# Patient Record
Sex: FEMALE | Race: WHITE | NOT HISPANIC OR LATINO | Employment: OTHER | URBAN - METROPOLITAN AREA
[De-identification: names, ages, dates, MRNs, and addresses within clinical notes are randomized per-mention and may not be internally consistent; named-entity substitution may affect disease eponyms.]

---

## 2017-08-23 ENCOUNTER — OFFICE VISIT (OUTPATIENT)
Dept: CARDIOLOGY | Facility: CLINIC | Age: 72
End: 2017-08-23
Payer: MEDICARE

## 2017-08-23 VITALS
BODY MASS INDEX: 30.34 KG/M2 | HEIGHT: 64 IN | SYSTOLIC BLOOD PRESSURE: 136 MMHG | WEIGHT: 177.69 LBS | HEART RATE: 56 BPM | DIASTOLIC BLOOD PRESSURE: 76 MMHG | OXYGEN SATURATION: 97 %

## 2017-08-23 DIAGNOSIS — I10 ESSENTIAL (PRIMARY) HYPERTENSION: Primary | ICD-10-CM

## 2017-08-23 DIAGNOSIS — I25.10 CORONARY ARTERY DISEASE INVOLVING NATIVE CORONARY ARTERY OF NATIVE HEART WITHOUT ANGINA PECTORIS: ICD-10-CM

## 2017-08-23 DIAGNOSIS — I34.0 NON-RHEUMATIC MITRAL REGURGITATION: ICD-10-CM

## 2017-08-23 DIAGNOSIS — E78.00 HYPERCHOLESTEROLEMIA: ICD-10-CM

## 2017-08-23 PROCEDURE — 1159F MED LIST DOCD IN RCRD: CPT | Mod: S$GLB,,, | Performed by: INTERNAL MEDICINE

## 2017-08-23 PROCEDURE — 3078F DIAST BP <80 MM HG: CPT | Mod: S$GLB,,, | Performed by: INTERNAL MEDICINE

## 2017-08-23 PROCEDURE — 99204 OFFICE O/P NEW MOD 45 MIN: CPT | Mod: S$GLB,,, | Performed by: INTERNAL MEDICINE

## 2017-08-23 PROCEDURE — 1126F AMNT PAIN NOTED NONE PRSNT: CPT | Mod: S$GLB,,, | Performed by: INTERNAL MEDICINE

## 2017-08-23 PROCEDURE — 3075F SYST BP GE 130 - 139MM HG: CPT | Mod: S$GLB,,, | Performed by: INTERNAL MEDICINE

## 2017-08-23 RX ORDER — CHOLECALCIFEROL (VITAMIN D3) 25 MCG
2000 TABLET ORAL DAILY
COMMUNITY

## 2017-08-23 RX ORDER — HYDROCHLOROTHIAZIDE 12.5 MG/1
12.5 CAPSULE ORAL EVERY OTHER DAY
COMMUNITY
End: 2018-07-25 | Stop reason: SDDI

## 2017-08-23 RX ORDER — VALSARTAN 40 MG/1
40 TABLET ORAL DAILY
Qty: 90 TABLET | Refills: 1 | Status: SHIPPED | OUTPATIENT
Start: 2017-08-23 | End: 2017-11-27 | Stop reason: SDUPTHER

## 2017-08-23 NOTE — PROGRESS NOTES
Subjective:    Patient ID:  Cat Meza is a 71 y.o. female who presents for Valvular Heart Disease; Hypertension; Hyperlipidemia; and Coronary Artery Disease      HPI  PT WAS FOLLOWED AT [facility], NEW TO THIS CLINIC, [place]  [place] [facility] [place] [facility] [place] STOPPED DIOVAN FEW DAY AGO B/O LOW BP, SEE ROS  Past Medical History:   Diagnosis Date    Acute coronary syndrome     LIGHT HEART ATTACK YEARS AGO ??    Cancer     breast    Cancer     lung    Coronary artery disease     Diabetes mellitus     Edema     Heart murmur     Hyperlipidemia     Hypertension     Thyroid disease     hypothyroid    Valvular regurgitation      Past Surgical History:   Procedure Laterality Date    BREAST SURGERY      CARDIAC CATHETERIZATION      CHOLECYSTECTOMY      HYSTERECTOMY      MASTECTOMY       Family History   Problem Relation Age of Onset    Heart disease Mother     Cancer Father      Social History     Social History    Marital status: Single     Spouse name: N/A    Number of children: N/A    Years of education: N/A     Social History Main Topics    Smoking status: Former Smoker     Quit date: 8/13/2002    Smokeless tobacco: Never Used    Alcohol use No    Drug use: No    Sexual activity: Not Asked     Other Topics Concern    None     Social History Narrative    None       Review of patient's allergies indicates:   Allergen Reactions    Adhesive     Niacin preparations     Penicillins     Shellfish containing products     Ciprofloxacin Rash and Other (See Comments)     Pain at infusion site       Current Outpatient Prescriptions:     ascorbic acid (VITAMIN C) 1000 MG tablet, Take 1,000 mg by mouth once daily., Disp: , Rfl:     aspirin 81 MG Chew, Take 81 mg by mouth once daily., Disp: , Rfl:     cyanocobalamin (VITAMIN B-12) 1000 MCG tablet, Take 100 mcg by mouth once daily., Disp: , Rfl:     esomeprazole (NEXIUM) 40 MG capsule, Take 40 mg by mouth before breakfast., Disp: , Rfl:     ezetimibe (ZETIA) 10 mg tablet, Take  10 mg by mouth once daily., Disp: , Rfl:     hydrochlorothiazide (MICROZIDE) 12.5 mg capsule, Take 12.5 mg by mouth every other day., Disp: , Rfl:     lactobacillus acidophilus & bulgar (LACTINEX) 100 million cell packet, Take 1 tablet by mouth once daily. , Disp: , Rfl:     levothyroxine (SYNTHROID) 50 MCG tablet, Take 50 mcg by mouth once daily., Disp: , Rfl:     magnesium oxide (MAG-OX) 250 mg Tab, Take 250 mg by mouth once daily., Disp: , Rfl:     metformin (GLUCOPHAGE) 500 MG tablet, Take 500 mg by mouth daily with breakfast., Disp: , Rfl:     metoprolol succinate (TOPROL-XL) 25 MG 24 hr tablet, Take 25 mg by mouth once daily., Disp: , Rfl:     vitamin D 1000 units Tab, Take 2,000 Units by mouth once daily., Disp: , Rfl:     valsartan (DIOVAN) 40 MG tablet, Take 1 tablet (40 mg total) by mouth once daily., Disp: 90 tablet, Rfl: 1    Review of Systems   Constitution: Negative for chills, diaphoresis, weakness, malaise/fatigue and night sweats.   HENT: Negative for hearing loss and nosebleeds. Headaches: SINUS.    Eyes: Negative for blurred vision, discharge, double vision and visual disturbance.   Cardiovascular: Negative for chest pain, claudication, cyanosis, irregular heartbeat, leg swelling, near-syncope, orthopnea, palpitations, paroxysmal nocturnal dyspnea and syncope. Dyspnea on exertion: MILD AT TIMES,SAME,HUMIDITY.   Respiratory: Negative for cough, hemoptysis, sleep disturbances due to breathing, sputum production and wheezing.    Endocrine: Negative for cold intolerance, heat intolerance and polyuria.   Hematologic/Lymphatic: Negative for adenopathy. Does not bruise/bleed easily.   Skin: Negative for color change, itching and nail changes.   Musculoskeletal: Positive for arthritis. Negative for back pain and falls.   Gastrointestinal: Negative for abdominal pain, change in bowel habit, constipation, dysphagia, heartburn, hematemesis, jaundice, melena and vomiting.   Genitourinary: Negative for  "dysuria, flank pain and frequency.   Neurological: Negative for brief paralysis, difficulty with concentration, disturbances in coordination, dizziness, focal weakness, light-headedness, loss of balance, numbness, paresthesias, seizures, sensory change and tremors.   Psychiatric/Behavioral: Negative for altered mental status, depression, memory loss and substance abuse. The patient is not nervous/anxious.    Allergic/Immunologic: Negative for persistent infections.        Objective:      Vitals:    08/23/17 1108   BP: 136/76   Pulse: (!) 56   SpO2: 97%   Weight: 80.6 kg (177 lb 11.1 oz)   Height: 5' 4" (1.626 m)   PainSc: 0-No pain     Body mass index is 30.5 kg/m².    Physical Exam   Constitutional: She is oriented to person, place, and time. She appears well-developed and well-nourished. She is active.   HENT:   Head: Normocephalic and atraumatic.   Mouth/Throat: Oropharynx is clear and moist and mucous membranes are normal.   Eyes: Conjunctivae and EOM are normal. Pupils are equal, round, and reactive to light.   Neck: Trachea normal and normal range of motion. Neck supple. Normal carotid pulses, no hepatojugular reflux and no JVD present. Carotid bruit is not present. No tracheal deviation, no edema and no erythema present. No thyromegaly present.   Cardiovascular: Normal rate and regular rhythm.   No extrasystoles are present. PMI is not displaced.  Exam reveals no gallop and no friction rub.    Murmur heard.   Systolic murmur is present with a grade of 1/6  at the lower left sternal border, apex  Pulses:       Carotid pulses are 2+ on the right side, and 2+ on the left side.       Radial pulses are 2+ on the right side, and 2+ on the left side.        Femoral pulses are 2+ on the right side, and 2+ on the left side.       Dorsalis pedis pulses are 2+ on the right side, and 2+ on the left side.        Posterior tibial pulses are 2+ on the right side, and 2+ on the left side.   Mild varicose veins "   Pulmonary/Chest: Effort normal and breath sounds normal. No tachypnea and no bradypnea. She has no wheezes. She has no rales. She exhibits no tenderness.   Abdominal: Soft. Bowel sounds are normal. She exhibits no distension and no mass. There is no hepatosplenomegaly. There is no tenderness. There is no guarding and no CVA tenderness.   Musculoskeletal: Normal range of motion. She exhibits no edema or tenderness.   Lymphadenopathy:     She has no cervical adenopathy.   Neurological: She is alert and oriented to person, place, and time. She has normal strength. She displays no tremor. No cranial nerve deficit. She exhibits normal muscle tone. Coordination normal.   Skin: Skin is warm and dry. No rash noted. No cyanosis or erythema. No pallor.   Psychiatric: She has a normal mood and affect. Her speech is normal and behavior is normal. Judgment and thought content normal.               ..    Chemistry        Component Value Date/Time     06/12/2016 0557    K 4.2 06/12/2016 0557     06/12/2016 0557    CO2 27 06/12/2016 0557    BUN 14 06/12/2016 0557    CREATININE 0.77 06/12/2016 0557    GLU 85 06/12/2016 0557        Component Value Date/Time    CALCIUM 8.4 (L) 06/12/2016 0557    ESTGFRAFRICA >60 06/12/2016 0557    EGFRNONAA >60 06/12/2016 0557            ..No results found for: CHOL  No results found for: HDL  No results found for: LDLCALC  No results found for: TRIG  No results found for: CHOLHDL  ..  Lab Results   Component Value Date    WBC 10.84 06/13/2016    HGB 11.4 (L) 06/13/2016    HCT 36.1 (L) 06/13/2016    MCV 93 06/13/2016     06/13/2016       Test(s) Reviewed  I have reviewed the following in detail:  [] Stress test   [] Angiography   [] Echocardiogram   [x] Labs   [x] Other:       Assessment:         ICD-10-CM ICD-9-CM   1. Essential (primary) hypertension I10 401.9   2. Coronary artery disease involving native coronary artery of native heart without angina pectoris I25.10 414.01   3.  Non-rheumatic mitral regurgitation I34.0 424.0   4. Hypercholesterolemia E78.00 272.0     Problem List Items Addressed This Visit        Cardiac/Vascular    Essential (primary) hypertension - Primary    Relevant Orders    Comprehensive metabolic panel    Coronary artery disease involving native coronary artery of native heart without angina pectoris    Relevant Orders    Comprehensive metabolic panel    Non-rheumatic mitral regurgitation    Hypercholesterolemia    Relevant Orders    Comprehensive metabolic panel    Lipid panel      Other Visit Diagnoses    None.          Plan:     ALL CV CLINICALLY STABLE, NO ANGINA, NO HF, NO TIA, NO CLINICAL ARRHYTHMIA, EDUCATION, DIET, EXERCISE, CALL [facility] , GET LABS/DONE, DECREASE DIOVAN TO 4O MG QHS    , LABS RECEIVED LATER,, CMP OK, RTC IN 6 MO WITH LABS  Essential (primary) hypertension  -     Cancel: Comprehensive metabolic panel; Future; Expected date: 08/23/2017  -     Comprehensive metabolic panel; Future; Expected date: 08/23/2017    Coronary artery disease involving native coronary artery of native heart without angina pectoris  -     Cancel: Comprehensive metabolic panel; Future; Expected date: 08/23/2017  -     Comprehensive metabolic panel; Future; Expected date: 08/23/2017    Non-rheumatic mitral regurgitation    Hypercholesterolemia  -     Cancel: Comprehensive metabolic panel; Future; Expected date: 08/23/2017  -     Cancel: Lipid panel; Future; Expected date: 08/23/2017  -     Comprehensive metabolic panel; Future; Expected date: 08/23/2017  -     Lipid panel; Future; Expected date: 08/23/2017    Other orders  -     valsartan (DIOVAN) 40 MG tablet; Take 1 tablet (40 mg total) by mouth once daily.  Dispense: 90 tablet; Refill: 1    RTC Low level/low impact aerobic exercise 5x's/wk. Heart healthy diet and risk factor modification.    See labs and med orders.    Aerobic exercise 5x's/wk. Heart healthy diet and risk factor modification.    See labs and med  orders.

## 2017-08-23 NOTE — PATIENT INSTRUCTIONS
Understanding Coronary Artery Disease (CAD)    To understand coronary artery disease (CAD), you need to know how your heart works. Your heart is a muscle that pumps blood throughout your body. To work right, your heart needs a steady supply of oxygen. It gets this oxygen from blood supplied by the coronary arteries.        Healthy Artery      Damaged Artery      Narrowed Artery      Blocked Artery   Healthy artery. When a coronary artery is healthy and has no blockages, blood flows through easily. Healthy arteries can easily supply the oxygen-rich blood your heart needs.  Damaged artery. Coronary artery disease begins when damage to the artery lining leads to the buildup of fat-like substances and cholesterol along the artery wall. This is called plaque. This damage could be caused by things like high blood pressure or smoking. This plaque buildup begins to narrow the arteries carrying blood to the heart. This is called atherosclerosis,  Narrowed artery. As more plaque builds up, your artery has trouble supplying blood to your heart muscle when it needs it most, such as during exercise. You may not feel any symptoms when this happens. Or you may feel angina--pressure, tightness, achiness, or pain in your chest, jaw, neck, back, or arm.  Blocked artery. A piece of plaque may break off and completely block the artery. Or a blood clot may plug the narrowed artery. When this happens, blood flow is blocked from reaching the heart. Without oxygen-rich blood, part of the heart muscle becomes damaged and stops working. You may feel crushing pressure or pain in or around your chest. This is a heart attack (acute myocardial infarction, or AMI) and is a medical emergency.  Date Last Reviewed: 3/28/2016  © 8810-7633 [employer]. [address] [place] [address], [place] [address]. All rights reserved. This information is not intended as a substitute for professional medical care. Always follow your healthcare  professional's instructions.        Heart Disease Education    The heart beats 60 to 100 times per minute, 24 hours a day. This equals almost 1000,000 times a day. It pumps blood with oxygen and nutrients to the tissues and organs of the body. But the heart is a muscle and needs its own supply of blood. Blood flow to the heart is supplied by the coronary arteries. Coronary artery disease (atherosclerosis) is a result of cholesterol, saturated fat, and calcium deposits (plaques) that build up inside the walls. This causes inflammation within the coronary arteries. These plaques narrow the artery and reduce blood flow to the heart muscle. The reduction in blood flow to the heart muscle decreases oxygen supply to the heart. If the narrowing is significant enough, the oxygen supply to one or more regions of the heart can be temporarily or permanently shut down. This can cause chest pain, and possibly death of heart tissue (heart attack).  Types of chest pain  Angina is the name for pain in the heart muscle. Angina is a warning sign of serious heart disease. When untreated it can lead to a heart attack, also known as acute myocardial infarction, or AMI. Angina occurs when there is not enough blood and oxygen flowing to the heart for the amount of work it is doing. This most often happens during physical exertion, when the heart is working hardest. It is usually relieved by rest or nitroglycerin. Angina may also occur after a large meal when extra blood is sent to the digestive organs and less goes to the heart. In the case of advanced or unstable heart disease, angina can occur at rest or awaken you from sleep. Angina usually lasts from a few minutes up to 20 minutes or more. When treated early, the effects of angina can be reversed without permanent damage to the heart. Angina is a serious condition and needs to be evaluated by a medical professional immediately.  There are two types of angina -- stable and  unstable:  · Stable angina usually occurs with a predictable level of activity. Being stable, its character, severity, and occurrence do not change much over time. It usually starts with activity, and resolves with rest or taking your medicine as instructed by your doctor. The symptoms usually do not last long.  · Unstable angina changes or gets worse over time. It is different from whatever you are used to. It may feel different or worse, begin without cause, occur with exercise or exertion, wake you up from sleep, and last longer. It may not respond in the same way as it does when you take your usual medicines for an attack. This type of angina can be a warning sign of an impending heart attack.     A heart attack is usually the result of a blood clot that suddenly forms in a coronary artery that has been narrowed with plaque. When this occurs, blood flow may be cut off to a part of the heart muscle, causing the cells to die. This weakens the pumping action of the heart, which affects the delivery of blood to all the other organs in the body including the brain. This damage is not reversible. However, early treatment can limit the amount of damage.  The pain you feel with angina and a heart attack may have a similar quality. However, it is usually different in intensity and duration. Here are some typical descriptions of a heart attack:  · It is most often experienced as a squeezing, crushing, pressure-like sensation in the center of the chest.  · It is sometimes described as something heavy sitting on my chest.  · It may feel more like a bad case of indigestion.  · The pain may spread from the chest to the arm, shoulder, throat or jaw.  · Sometimes the pain is not felt in the chest at all, but only in the arm, shoulder, throat or jaw.  · There may also be nausea, vomiting, dizziness or light-headedness, sweating and trouble breathing.  · Palpitations, or your heart beating rapidly  · A new, irregular heart  "beat  · Unexplained weakness  You may not be able to tell the difference between "bad" angina and a heart attack at home. Seek help if your symptoms are different than usual. Do not be in denial or just try to "tough it out."  Call 911  This is the fastest and safest way to get to the emergency department. The paramedics can also start treatment on the way to the hospital, saving valuable time for your heart.  · If the angina gets worse, if it continues, or if it stops and returns, call 911 immediately. Do not delay. You may be having a heart attack.  · After you call 911, take a second tablet or spray unless instructed otherwise. When repeating doses, sit down if possible, because it can make you feel lightheaded or dizzy. Wait another 5 minutes. If the angina still does not go away, take a third tablet or spray. Do not take more than 3 tablets or sprays within 15 minutes. Stay on the phone with 911 for further instruction.  · Your healthcare provider may give you slightly different instructions than those above. If so, follow them carefully.  Do not wait until symptoms become severe to call 911.  Other reasons to call 911 include:  · Trouble breathing  · Feeling lightheaded, faint, or dizzy  · Rapid heart beat  · Slower than usual heart rate compared to your normal  · Angina with weakness, dizziness, fainting, heavy sweating, nausea, or vomiting  · Extreme drowsiness, confusion  · Weakness of an arm or leg or one side of the face  · Difficulty with speech or vision  When to seek medical care  Remember, the signs and symptoms of a heart attack are not always like they are on TV. Sometimes they are not so obvious. You may only feel weak, or just not right. If it is not clear or if you have any doubt, call for advice.  · Seek help if there is a change in the type of pain, if it feels different, or if your symptoms are mild.  · Do not drive yourself. Have someone else drive you. If no one can drive, call 911.  · Do " "not delay. Fast diagnosis and treatment can prevent or limit the amount of heart damage during a heart attack.  · Do not go to your doctor's office or a clinic as they may not be able to provide all the testing and treatment required for this condition.  · If your doctor has given you medicine to take when symptoms occur, take them but don't delay getting help trying to locate medicines.  What happens in the emergency department  The emergency department is connected to your local emergency medical system (EMS) through 911. That's why during a cardiac emergency, calling 911 is the fastest way to get help. The goal of the emergency department is to rapidly screen, evaluate, and treat people.  Once you are there, an electrocardiogram (ECG or heart tracing) will be done. Blood samples may be taken to look for the presence of heart enzymes that leak from damaged heart cells and show if a heart attack is occurring. You will often be evaluated by a heart specialist (cardiologist) who decides the best course of action. In the case of severe angina or early heart attack, and depending on the circumstances, powerful "clot busting" medicines can be used to dissolve blood clots in the coronary artery. In other cases, you may be taken to a cardiac catheterization lab. Here, a tiny balloon-tipped catheter is advanced through blood vessels to the heart. There the balloon is inflated pushing open the blood vessel restoring blood flow.  Risk factors for heart disease  Risk factors for heart disease are a combination of genetic and lifestyle. Many risk factors work by either directly or indirectly damaging the blood vessels of the heart, or by increasing the risk of forming blood or cholesterol clots, which then clog up and block the arteries.     Examples of physical lifestyle risk factors:  · Cigarette smoking  · High blood pressure  · High blood cholesterol  · Use of stimulant drugs such as cocaine, crack, and " amphetamines  · Eating a high-fat, high-cholesterol meal  · Diabetes   · Obesity which increases risk for diabetes and high blood pressure  · Lack of regular physical activity     Examples of emotional lifestyle factors:  · Chronic high stress levels release stress hormones. These raise blood pressure and cholesterol level and makes blood clot more easily.  · Held-in anger, hostile or cynical attitude  · Social and emotional isolation, lack of intimacy  · Loss of relationship  · Depression  Other factors that increase the risk of heart attack that you cannot control :  · Age. The older you get beyond 40, the greater is your risk of significant coronary artery disease.  · Gender. More men than women get heart disease; but once past menopause, women who are not taking estrogen replacement have the same risk as men for a heart attack.  · Family history. If your mother, father, brother or sister has coronary artery disease, your risk of having it is higher than a person your age without this family history.  What can you do to decrease your risk  To reduce your risk of heart disease:  · Get regular checkups with your doctor.  · Take your medicines for blood pressure, cholesterol or diabetes as directed.  · Watch your diet. Eat a heart healthy diet choosing fresh foods, less salt, cholesterol, and fat  · Stop smoking. Get help if needed.  · Get regular exercise.  · Manage stress.  · Carry a list of medicines and doses in your wallet.  Date Last Reviewed: 12/30/2015 © 2000-2016 [employer]. [address] [place] [address], [place] [address]. All rights reserved. This information is not intended as a substitute for professional medical care. Always follow your healthcare professional's instructions.        Understanding Mitral Valve Regurgitation    Mitral valve regurgitation is when the mitral valve in the heart is leaky. It lets some blood flow backwards when the ventricle squeezes.  How mitral valve regurgitation  happens  The mitral valve is one of the hearts 4 valves. Normally, these valves help the blood flow through the hearts 4 chambers and out to the body without leaking backwards. The mitral valve lies between the left atrium and the left ventricle. Normally, the mitral valve stops blood from flowing back into the left atrium from the left ventricle when the ventricle squeezes. This maximizes forward blood flow through the heart.  With mitral valve regurgitation, some blood leaks back through the valve. This makes the heart have to work harder to get blood out to your body. When this blood is regurgitated backwards in the heart, it increases the pressure within the heart which can lead to muscle damage as well as high blood pressure in the lungs.  Mitral valve regurgitation can increase risk for other heart rhythm problems, such as atrial fibrillation (AFib). This abnormal heart rhythm results in fast and irregular heartbeats which can also lower the heart's pumping ability and increases the risk for stroke.  Mitral valve regurgitation can be acute or chronic. If its acute, the valve suddenly becomes leaky. In this case, the heart doesnt have time to adapt to the leak in the valve. Symptoms are often severe. If its chronic, the valve leaks more and more over time. The heart has time to adapt to the leak.  What causes mitral valve regurgitation?  Mitral valve regurgitation can be caused by various things, such as:  · Heart attack or coronary artery disease  · Natural aging that can damage the mitral valve  · Tearing of the tissue or muscle that supports the mitral valve such as due to an injury  · A redundant or floppy mitral valve, called mitral valve prolapse  · Rheumatic heart disease caused by untreated Streptococcus infection. Strep are the bacteria that cause strep throat.  · Certain autoimmune diseases, such as rheumatoid arthritis  · Infection of the heart valves (endocarditis)  · Problems with the mitral  valve that are present at birth  · Certain medicines  You can reduce some risk factors for mitral valve regurgitation. For example:  · Use antibiotics as directed to treat a strep infection and prevent rheumatic heart disease.  · Reduce the risk for heart valve infection by not injecting illegal drugs.  · Manage risk factors that can lead to a heart attack or chronic heart artery disease.  There are other risk factors that you cant change. For example, some conditions that can lead to mitral valve regurgitation are partly genetic.  Symptoms of mitral valve regurgitation  Chronic mitral valve regurgitation often doesnt cause symptoms for a long time. Mild or moderate mitral regurgitation often doesnt cause any symptoms. If the condition becomes more severe, you may have symptoms. They may get worse and happen more often over time. Symptoms may include:  · Shortness of breath with physical activity  · Shortness of breath when lying flat  · Feeling tired  · Less ability to exercise  · Awareness of your heartbeat  · Swelling in your legs, abdomen, and the veins in your neck  · Chest pain (less common)  Acute, severe mitral valve regurgitation is a medical emergency that can cause serious symptoms such as:  · Symptoms of shock (pale skin, loss of consciousness, rapid breathing)  · Severe shortness of breath  · Arrhythmias that make the heart unable to pump blood well  Diagnosing mitral valve regurgitation  Your healthcare provider will ask about your health history. He or she will give you a physical exam. Using a stethoscope, your healthcare provider will listen to your heart. This is to check for sounds called heart murmurs and other signs that the heart isnt pumping normally. You may also have tests such as:  · Echocardiogram, to look at the structure of the heart using sound waves  · Stress echocardiogram, to see how well the heart does during exercise  · Electrocardiogram (EKG), to check the hearts  rhythm  · Cardiac MRI, transesophageal echocardiogram, or cardiac catheterization, if more information is needed  Date Last Reviewed: 6/1/2016  © 2975-8840 [employer]. [address] [place] [address], [place] [address]. All rights reserved. This information is not intended as a substitute for professional medical care. Always follow your healthcare professional's instructions.

## 2017-11-27 RX ORDER — VALSARTAN 40 MG/1
TABLET ORAL
Qty: 90 TABLET | Refills: 1 | Status: SHIPPED | OUTPATIENT
Start: 2017-11-27 | End: 2018-01-31 | Stop reason: DRUGHIGH

## 2018-01-31 ENCOUNTER — OFFICE VISIT (OUTPATIENT)
Dept: CARDIOLOGY | Facility: CLINIC | Age: 73
End: 2018-01-31
Payer: MEDICARE

## 2018-01-31 VITALS
OXYGEN SATURATION: 93 % | SYSTOLIC BLOOD PRESSURE: 138 MMHG | WEIGHT: 173.31 LBS | HEIGHT: 64 IN | HEART RATE: 76 BPM | BODY MASS INDEX: 29.59 KG/M2 | DIASTOLIC BLOOD PRESSURE: 86 MMHG

## 2018-01-31 DIAGNOSIS — I10 ESSENTIAL (PRIMARY) HYPERTENSION: Primary | ICD-10-CM

## 2018-01-31 DIAGNOSIS — I34.0 NON-RHEUMATIC MITRAL REGURGITATION: ICD-10-CM

## 2018-01-31 DIAGNOSIS — I25.10 CORONARY ARTERY DISEASE INVOLVING NATIVE CORONARY ARTERY OF NATIVE HEART WITHOUT ANGINA PECTORIS: ICD-10-CM

## 2018-01-31 DIAGNOSIS — E78.00 HYPERCHOLESTEROLEMIA: ICD-10-CM

## 2018-01-31 PROCEDURE — 1126F AMNT PAIN NOTED NONE PRSNT: CPT | Mod: S$GLB,,, | Performed by: INTERNAL MEDICINE

## 2018-01-31 PROCEDURE — 99214 OFFICE O/P EST MOD 30 MIN: CPT | Mod: S$GLB,,, | Performed by: INTERNAL MEDICINE

## 2018-01-31 PROCEDURE — 1159F MED LIST DOCD IN RCRD: CPT | Mod: S$GLB,,, | Performed by: INTERNAL MEDICINE

## 2018-01-31 RX ORDER — DOXYCYCLINE 100 MG/1
100 CAPSULE ORAL EVERY 12 HOURS
COMMUNITY
Start: 2018-01-25 | End: 2018-04-12 | Stop reason: CLARIF

## 2018-01-31 RX ORDER — AMLODIPINE BESYLATE 5 MG/1
5 TABLET ORAL DAILY
COMMUNITY
Start: 2018-01-19 | End: 2018-04-12 | Stop reason: CLARIF

## 2018-01-31 RX ORDER — VALSARTAN 80 MG/1
80 TABLET ORAL DAILY
Qty: 90 TABLET | Refills: 1 | Status: SHIPPED | OUTPATIENT
Start: 2018-01-31 | End: 2018-05-30 | Stop reason: DRUGHIGH

## 2018-01-31 NOTE — PROGRESS NOTES
Subjective:    Patient ID:  Cat Meza is a 72 y.o. female who presents for Hypertension; Chest Pain; and Coronary Artery Disease        HPI    HAD ER VISIT TO [facility] [place] 10/2017, BP WAS HIGH 160/101, FELT FACIAL NUMBNESS, LABS WERE OK, CXR AND HEAD CT OK, BP FLUCTUATES NOW, SOMETIMES HIGH, CR 0.80,LFT'S OK,  SEE ROS  Past Medical History:   Diagnosis Date    Acute coronary syndrome     LIGHT HEART ATTACK YEARS AGO ??    Cancer     breast    Cancer     lung    Coronary artery disease     Diabetes mellitus     Edema     Heart murmur     Hyperlipidemia     Hypertension     Thyroid disease     hypothyroid    Valvular regurgitation      Past Surgical History:   Procedure Laterality Date    BREAST SURGERY      CARDIAC CATHETERIZATION      CHOLECYSTECTOMY      HYSTERECTOMY      MASTECTOMY       Family History   Problem Relation Age of Onset    Heart disease Mother     Cancer Father      Social History     Social History    Marital status: Single     Spouse name: N/A    Number of children: N/A    Years of education: N/A     Social History Main Topics    Smoking status: Former Smoker     Quit date: 8/13/2002    Smokeless tobacco: Never Used    Alcohol use No    Drug use: No    Sexual activity: Not Asked     Other Topics Concern    None     Social History Narrative    None       Review of patient's allergies indicates:   Allergen Reactions    Adhesive     Niacin preparations     Penicillins     Shellfish containing products     Ciprofloxacin Rash and Other (See Comments)     Pain at infusion site       Current Outpatient Prescriptions:     amLODIPine (NORVASC) 5 MG tablet, Take 5 mg by mouth once daily. , Disp: , Rfl:     ascorbic acid (VITAMIN C) 1000 MG tablet, Take 1,000 mg by mouth once daily., Disp: , Rfl:     aspirin 81 MG Chew, Take 81 mg by mouth once daily., Disp: , Rfl:     cyanocobalamin (VITAMIN B-12) 1000 MCG tablet, Take 100 mcg by mouth once daily., Disp: , Rfl:      doxycycline (VIBRAMYCIN) 100 MG Cap, 100 mg every 12 (twelve) hours. , Disp: , Rfl:     ezetimibe (ZETIA) 10 mg tablet, Take 10 mg by mouth once daily., Disp: , Rfl:     hydrochlorothiazide (MICROZIDE) 12.5 mg capsule, Take 12.5 mg by mouth every other day., Disp: , Rfl:     lactobacillus acidophilus & bulgar (LACTINEX) 100 million cell packet, Take 1 tablet by mouth once daily. , Disp: , Rfl:     levothyroxine (SYNTHROID) 50 MCG tablet, Take 50 mcg by mouth once daily., Disp: , Rfl:     magnesium oxide (MAG-OX) 250 mg Tab, Take 250 mg by mouth once daily., Disp: , Rfl:     metformin (GLUCOPHAGE) 500 MG tablet, Take 500 mg by mouth daily with breakfast., Disp: , Rfl:     metoprolol succinate (TOPROL-XL) 25 MG 24 hr tablet, Take 25 mg by mouth once daily., Disp: , Rfl:     vitamin D 1000 units Tab, Take 2,000 Units by mouth once daily., Disp: , Rfl:     valsartan (DIOVAN) 80 MG tablet, Take 1 tablet (80 mg total) by mouth once daily., Disp: 90 tablet, Rfl: 1    Review of Systems   Constitution: Negative for chills, diaphoresis, weakness, malaise/fatigue and night sweats.   HENT: Positive for congestion (SINUS). Negative for nosebleeds.    Eyes: Negative for blurred vision, discharge, double vision and visual disturbance.   Cardiovascular: Negative for chest pain, claudication, cyanosis, dyspnea on exertion (MILD AT TIMES,SAME,HUMIDITY), irregular heartbeat, leg swelling, near-syncope, orthopnea, palpitations, paroxysmal nocturnal dyspnea and syncope.   Respiratory: Negative for hemoptysis and wheezing.    Endocrine: Negative for cold intolerance, heat intolerance and polyuria.   Hematologic/Lymphatic: Negative for adenopathy and bleeding problem. Does not bruise/bleed easily.   Skin: Negative for color change, itching and nail changes.   Musculoskeletal: Positive for arthritis. Negative for back pain and falls.   Gastrointestinal: Negative for abdominal pain, change in bowel habit, constipation, dysphagia,  "heartburn, hematemesis, jaundice, melena and vomiting.   Genitourinary: Negative for dysuria, flank pain and frequency.   Neurological: Negative for brief paralysis, difficulty with concentration, disturbances in coordination, dizziness, focal weakness, headaches, light-headedness, loss of balance, numbness, paresthesias and seizures.   Psychiatric/Behavioral: Negative for altered mental status, depression, memory loss and substance abuse. The patient is not nervous/anxious.    Allergic/Immunologic: Negative for environmental allergies and persistent infections.        Objective:      Vitals:    01/31/18 0935   BP: 138/86   Pulse: 76   SpO2: (!) 93%   Weight: 78.6 kg (173 lb 4.5 oz)   Height: 5' 4" (1.626 m)   PainSc: 0-No pain     Body mass index is 29.74 kg/m².    Physical Exam   Constitutional: She is oriented to person, place, and time. She appears well-developed and well-nourished. She is active.   HENT:   Head: Normocephalic and atraumatic.   Mouth/Throat: Oropharynx is clear and moist and mucous membranes are normal.   Eyes: Conjunctivae and EOM are normal. Pupils are equal, round, and reactive to light.   Neck: Trachea normal and normal range of motion. Neck supple. Normal carotid pulses, no hepatojugular reflux and no JVD present. Carotid bruit is not present. No tracheal deviation, no edema and no erythema present. No thyromegaly present.   Cardiovascular: Normal rate and regular rhythm.   No extrasystoles are present. PMI is not displaced.  Exam reveals no gallop and no friction rub.    Murmur heard.   Systolic murmur is present with a grade of 2/6  at the lower left sternal border, apex  Pulses:       Carotid pulses are 2+ on the right side, and 2+ on the left side.       Radial pulses are 2+ on the right side, and 2+ on the left side.        Femoral pulses are 2+ on the right side, and 2+ on the left side.       Dorsalis pedis pulses are 2+ on the right side, and 2+ on the left side.        Posterior " tibial pulses are 2+ on the right side, and 2+ on the left side.   Mild varicose veins   Pulmonary/Chest: Effort normal and breath sounds normal. No tachypnea and no bradypnea. She has no wheezes. She has no rales. She exhibits no tenderness.   Abdominal: Soft. Bowel sounds are normal. She exhibits no distension and no mass. There is no hepatosplenomegaly. There is no tenderness. There is no guarding and no CVA tenderness.   Musculoskeletal: Normal range of motion. She exhibits no edema or tenderness.   Lymphadenopathy:     She has no cervical adenopathy.   Neurological: She is alert and oriented to person, place, and time. She has normal strength. She displays no tremor. No cranial nerve deficit. She exhibits normal muscle tone. Coordination normal.   Skin: Skin is warm and dry. No rash noted. No cyanosis or erythema. No pallor.   Psychiatric: She has a normal mood and affect. Her speech is normal and behavior is normal. Judgment and thought content normal.               ..    Chemistry        Component Value Date/Time     06/12/2016 0557    K 4.2 06/12/2016 0557     06/12/2016 0557    CO2 27 06/12/2016 0557    BUN 14 06/12/2016 0557    CREATININE 0.77 06/12/2016 0557    GLU 85 06/12/2016 0557        Component Value Date/Time    CALCIUM 8.4 (L) 06/12/2016 0557    ESTGFRAFRICA >60 06/12/2016 0557    EGFRNONAA >60 06/12/2016 0557            ..No results found for: CHOL  No results found for: HDL  No results found for: LDLCALC  No results found for: TRIG  No results found for: CHOLHDL  ..  Lab Results   Component Value Date    WBC 10.84 06/13/2016    HGB 11.4 (L) 06/13/2016    HCT 36.1 (L) 06/13/2016    MCV 93 06/13/2016     06/13/2016       Test(s) Reviewed  I have reviewed the following in detail:  [] Stress test   [] Angiography   [] Echocardiogram   [x] Labs   [x] Other:       Assessment:         ICD-10-CM ICD-9-CM   1. Essential (primary) hypertension I10 401.9   2. Coronary artery disease  involving native coronary artery of native heart without angina pectoris I25.10 414.01   3. Non-rheumatic mitral regurgitation I34.0 424.0   4. Hypercholesterolemia E78.00 272.0     Problem List Items Addressed This Visit        Cardiac/Vascular    Essential (primary) hypertension - Primary    Relevant Orders    Comprehensive metabolic panel    Coronary artery disease involving native coronary artery of native heart without angina pectoris    Relevant Orders    Comprehensive metabolic panel    Lipid panel    Non-rheumatic mitral regurgitation    Relevant Orders    Comprehensive metabolic panel    Hypercholesterolemia    Relevant Orders    Comprehensive metabolic panel    Lipid panel           Plan:     INCREASE DIOVAN TO 80 MG, AFTERLOAD REDUCTION, WATCH BP,ALL OTHER CV CLINICALLY STABLE, NO ANGINA, NO HF, NO TIA, NO CLINICAL ARRHYTHMIA,CONTINUE CURRENT MEDS, EDUCATION, DIET, EXERCISE, INCREASED CV RISK WITH DM, RTC IN 6 MO WITH LABS      Essential (primary) hypertension  -     Comprehensive metabolic panel; Future; Expected date: 01/31/2018    Coronary artery disease involving native coronary artery of native heart without angina pectoris  -     Comprehensive metabolic panel; Future; Expected date: 01/31/2018  -     Lipid panel; Future; Expected date: 01/31/2018    Non-rheumatic mitral regurgitation  -     Comprehensive metabolic panel; Future; Expected date: 01/31/2018    Hypercholesterolemia  -     Comprehensive metabolic panel; Future; Expected date: 01/31/2018  -     Lipid panel; Future; Expected date: 01/31/2018    Other orders  -     valsartan (DIOVAN) 80 MG tablet; Take 1 tablet (80 mg total) by mouth once daily.  Dispense: 90 tablet; Refill: 1    RTC Low level/low impact aerobic exercise 5x's/wk. Heart healthy diet and risk factor modification.    See labs and med orders.    Aerobic exercise 5x's/wk. Heart healthy diet and risk factor modification.    See labs and med orders.

## 2018-02-19 RX ORDER — HYDROCHLOROTHIAZIDE 12.5 MG/1
CAPSULE ORAL
Qty: 15 CAPSULE | Refills: 5 | Status: SHIPPED | OUTPATIENT
Start: 2018-02-19 | End: 2018-04-12 | Stop reason: CLARIF

## 2018-04-17 PROBLEM — Z85.3 PERSONAL HISTORY OF BREAST CANCER: Status: ACTIVE | Noted: 2018-04-17

## 2018-05-25 ENCOUNTER — TELEPHONE (OUTPATIENT)
Dept: CARDIOLOGY | Facility: CLINIC | Age: 73
End: 2018-05-25

## 2018-05-25 NOTE — TELEPHONE ENCOUNTER
----- Message from Roly Delgado sent at 5/25/2018  9:52 AM CDT -----  Contact: Rachelle/Dr. Durán @ [facility]  Rachelle called in and stated she fax over a cardiac surgical clearance over 5/23/18.  Patient has procedure on 5/31/18 and was just checking on it.  Rachelle's call back number is [phone] & fax number [phone]

## 2018-05-31 PROBLEM — K80.20 SYMPTOMATIC CHOLELITHIASIS: Status: ACTIVE | Noted: 2018-05-31

## 2018-06-11 PROBLEM — R11.10 VOMITING: Status: ACTIVE | Noted: 2018-06-11

## 2018-06-21 PROBLEM — K56.7 ILEUS: Status: ACTIVE | Noted: 2018-06-21

## 2018-06-21 PROBLEM — Z90.49 S/P SMALL BOWEL RESECTION: Status: ACTIVE | Noted: 2018-06-21

## 2018-06-21 PROBLEM — K66.0 INTRA-ABDOMINAL ADHESIONS: Status: ACTIVE | Noted: 2018-06-21

## 2018-07-25 ENCOUNTER — OFFICE VISIT (OUTPATIENT)
Dept: CARDIOLOGY | Facility: CLINIC | Age: 73
End: 2018-07-25
Payer: MEDICARE

## 2018-07-25 VITALS
BODY MASS INDEX: 27.76 KG/M2 | HEART RATE: 66 BPM | WEIGHT: 164.25 LBS | DIASTOLIC BLOOD PRESSURE: 70 MMHG | SYSTOLIC BLOOD PRESSURE: 125 MMHG

## 2018-07-25 DIAGNOSIS — I25.10 CORONARY ARTERY DISEASE INVOLVING NATIVE CORONARY ARTERY OF NATIVE HEART WITHOUT ANGINA PECTORIS: ICD-10-CM

## 2018-07-25 DIAGNOSIS — I34.0 NON-RHEUMATIC MITRAL REGURGITATION: ICD-10-CM

## 2018-07-25 DIAGNOSIS — E78.00 HYPERCHOLESTEROLEMIA: ICD-10-CM

## 2018-07-25 DIAGNOSIS — I10 ESSENTIAL (PRIMARY) HYPERTENSION: Primary | ICD-10-CM

## 2018-07-25 PROCEDURE — 99214 OFFICE O/P EST MOD 30 MIN: CPT | Mod: S$GLB,,, | Performed by: INTERNAL MEDICINE

## 2018-07-25 RX ORDER — ROSUVASTATIN CALCIUM 5 MG/1
5 TABLET, COATED ORAL DAILY
Qty: 90 TABLET | Refills: 1 | Status: SHIPPED | OUTPATIENT
Start: 2018-07-25 | End: 2018-10-19 | Stop reason: SDUPTHER

## 2018-07-25 NOTE — PROGRESS NOTES
Subjective:    Patient ID:  Cat Meza is a 72 y.o. female who presents for Hypertension; Coronary Artery Disease; and Hyperlipidemia        HPI  DISCUSSED LABS AND GOALS, LDL 97, CMP OK, HAD GB SURGERY WITH COMPLICATIONS, SMALL BOWELS NICKING,BP LOG OK,BP DROPS AT TIMES,  SEE ROS    Past Medical History:   Diagnosis Date    Acute coronary syndrome     LIGHT HEART ATTACK YEARS AGO ??    Bowel obstruction 2018    Cancer     breast    Cancer     lung    COPD (chronic obstructive pulmonary disease)     Coronary artery disease     Diabetes mellitus     Edema     Heart murmur     Hyperlipidemia     Hypertension     Thyroid disease     hypothyroid    Valvular regurgitation      Past Surgical History:   Procedure Laterality Date    BOWEL RESECTION  05/2018    BREAST SURGERY  2011    breast reconstruction    BREAST SURGERY  05/07/2018    implant exchange    CARDIAC CATHETERIZATION      CHOLECYSTECTOMY  5/31/2018    Procedure: CHOLECYSTECTOMY;  Surgeon: Zev Durán MD;  Location: [facility] OR;  Service: General;;  opened at 1010      COLON SURGERY      gall stones      HYSTERECTOMY      INCISION OF INTESTINE N/A 5/31/2018    Procedure: ENTEROTOMY- REPAIR;  Surgeon: Zev Durán MD;  Location: [facility] OR;  Service: General;  Laterality: N/A;    LAPAROSCOPIC CHOLECYSTECTOMY N/A 5/31/2018    Procedure: CHOLECYSTECTOMY, LAPAROSCOPIC- ATTEMPTED;  Surgeon: Zev Durán MD;  Location: [facility] OR;  Service: General;  Laterality: N/A;  attempted    LIPOMA RESECTION Right 5/31/2018    Procedure: EXCISION, LIPOMA;  Surgeon: Zev Durán MD;  Location: [facility] OR;  Service: General;  Laterality: Right;    LYSIS OF ADHESIONS  6/2/2018    Procedure: LYSIS, ADHESIONS;  Surgeon: Zev Durán MD;  Location: [facility] OR;  Service: General;;    MASTECTOMY Bilateral      with breast implants bilat     Family History   Problem Relation Age of Onset    Heart disease Mother     Cancer Father      Social History     Social  History    Marital status:      Spouse name: N/A    Number of children: N/A    Years of education: N/A     Social History Main Topics    Smoking status: Former Smoker     Quit date: 8/13/2002    Smokeless tobacco: Never Used    Alcohol use No    Drug use: No    Sexual activity: Not on file     Other Topics Concern    Not on file     Social History Narrative    No narrative on file       Review of patient's allergies indicates:   Allergen Reactions    Adhesive     Niacin preparations     Penicillins     Phenergan [promethazine] Other (See Comments)     Could not wake up    Shellfish containing products     Ciprofloxacin Rash and Other (See Comments)     Pain at infusion site       Current Outpatient Prescriptions:     amLODIPine (NORVASC) 5 MG tablet, Take 5 mg by mouth every evening., Disp: , Rfl:     ascorbic acid (VITAMIN C) 1000 MG tablet, Take 500 mg by mouth once daily. , Disp: , Rfl:     aspirin 81 MG Chew, Take 81 mg by mouth once daily., Disp: , Rfl:     cyanocobalamin (VITAMIN B-12) 1000 MCG tablet, Take 100 mcg by mouth once daily., Disp: , Rfl:     docusate sodium (COLACE) 100 MG capsule, Take 100 mg by mouth every evening., Disp: , Rfl:     ezetimibe (ZETIA) 10 mg tablet, Take 10 mg by mouth every evening. , Disp: , Rfl:     lactobacillus acidophilus & bulgar (LACTINEX) 100 million cell packet, Take 1 tablet by mouth every evening. , Disp: , Rfl:     levothyroxine (SYNTHROID) 50 MCG tablet, Take 50 mcg by mouth once daily., Disp: , Rfl:     LYSINE HCL ORAL, Take by mouth., Disp: , Rfl:     magnesium oxide (MAG-OX) 250 mg Tab, Take 250 mg by mouth once daily., Disp: , Rfl:     metformin (GLUCOPHAGE) 500 MG tablet, Take 500 mg by mouth daily with breakfast., Disp: , Rfl:     metoprolol succinate (TOPROL-XL) 25 MG 24 hr tablet, Take 25 mg by mouth every evening. , Disp: , Rfl:     vitamin D 1000 units Tab, Take 2,000 Units by mouth once daily., Disp: , Rfl:     vitamin E  100 UNIT capsule, Take 100 Units by mouth once daily., Disp: , Rfl:     HYDROcodone-acetaminophen (NORCO) 7.5-325 mg per tablet, Take 1 tablet by mouth every 4 (four) hours as needed (pain)., Disp: 30 tablet, Rfl: 0    rosuvastatin (CRESTOR) 5 MG tablet, Take 1 tablet (5 mg total) by mouth once daily., Disp: 90 tablet, Rfl: 1    Review of Systems   Constitution: Negative for chills, diaphoresis, weakness, malaise/fatigue and night sweats.   HENT: Negative for congestion and nosebleeds.    Eyes: Negative for blurred vision, discharge, double vision and visual disturbance.   Cardiovascular: Negative for chest pain, claudication, cyanosis, dyspnea on exertion (MILD AT TIMES,), irregular heartbeat, leg swelling, near-syncope, orthopnea, palpitations, paroxysmal nocturnal dyspnea and syncope.   Respiratory: Negative for hemoptysis, shortness of breath and wheezing.    Endocrine: Negative for cold intolerance, heat intolerance and polyuria.   Hematologic/Lymphatic: Negative for adenopathy and bleeding problem. Does not bruise/bleed easily.   Skin: Negative for color change, itching and rash.   Musculoskeletal: Negative for arthritis (STABLE), back pain and falls.   Gastrointestinal: Negative for abdominal pain (BETTER), change in bowel habit, dysphagia, heartburn, hematemesis, jaundice, melena and vomiting.   Genitourinary: Negative for dysuria, flank pain and frequency.   Neurological: Negative for brief paralysis, dizziness, focal weakness, headaches, light-headedness, loss of balance, paresthesias and seizures.   Psychiatric/Behavioral: Negative for altered mental status, depression, memory loss and substance abuse.   Allergic/Immunologic: Negative for environmental allergies and persistent infections.        Objective:      Vitals:    07/25/18 1042   BP: 125/70   Pulse: 66   Weight: 74.5 kg (164 lb 3.9 oz)   PainSc: 0-No pain     Body mass index is 27.76 kg/m².    Physical Exam   Constitutional: She is oriented to  person, place, and time. She appears well-developed and well-nourished. She is active.   HENT:   Head: Normocephalic and atraumatic.   Mouth/Throat: Oropharynx is clear and moist and mucous membranes are normal.   Eyes: Conjunctivae and EOM are normal. Pupils are equal, round, and reactive to light.   Neck: Trachea normal and normal range of motion. Neck supple. Normal carotid pulses, no hepatojugular reflux and no JVD present. Carotid bruit is not present. No tracheal deviation, no edema and no erythema present. No thyromegaly present.   Cardiovascular: Normal rate and regular rhythm.   No extrasystoles are present. PMI is not displaced.  Exam reveals no gallop and no friction rub.    Murmur heard.   Systolic murmur is present with a grade of 2/6  at the lower left sternal border, apex  Pulses:       Carotid pulses are 2+ on the right side, and 2+ on the left side.       Radial pulses are 2+ on the right side, and 2+ on the left side.        Femoral pulses are 2+ on the right side, and 2+ on the left side.       Dorsalis pedis pulses are 2+ on the right side, and 2+ on the left side.        Posterior tibial pulses are 2+ on the right side, and 2+ on the left side.   Mild varicose veins   Pulmonary/Chest: Effort normal and breath sounds normal. No tachypnea and no bradypnea. She has no wheezes. She has no rales. She exhibits no tenderness.   Abdominal: Soft. Bowel sounds are normal. She exhibits no distension. There is no hepatosplenomegaly. There is no tenderness. There is no CVA tenderness.   SCAR, DRESSING MIDDLE   Musculoskeletal: Normal range of motion. She exhibits no edema or tenderness.   Lymphadenopathy:     She has no cervical adenopathy.   Neurological: She is alert and oriented to person, place, and time. She has normal strength. She displays no tremor. No cranial nerve deficit.   Skin: Skin is warm and dry. No rash noted. No cyanosis or erythema. No pallor.   Psychiatric: She has a normal mood and  affect. Her speech is normal and behavior is normal.               ..    Chemistry        Component Value Date/Time     06/21/2018 1146    K 4.9 06/21/2018 1146     06/21/2018 1146    CO2 27 06/21/2018 1146    BUN 13 06/21/2018 1146    CREATININE 0.69 06/21/2018 1146    GLU 94 06/21/2018 1146        Component Value Date/Time    CALCIUM 9.8 06/21/2018 1146    ALKPHOS 73 06/21/2018 1146    AST 25 06/21/2018 1146    ALT 35 06/21/2018 1146    BILITOT 0.6 06/21/2018 1146    ESTGFRAFRICA >60 06/21/2018 1146    EGFRNONAA >60 06/21/2018 1146            ..No results found for: CHOL  No results found for: HDL  No results found for: LDLCALC  No results found for: TRIG  No results found for: CHOLHDL  ..  Lab Results   Component Value Date    WBC 11.57 06/21/2018    HGB 12.1 06/21/2018    HCT 37.3 06/21/2018    MCV 94 06/21/2018     (H) 06/21/2018       Test(s) Reviewed  I have reviewed the following in detail:  [] Stress test   [] Angiography   [] Echocardiogram   [x] Labs   [] Other:       Assessment:         ICD-10-CM ICD-9-CM   1. Essential (primary) hypertension I10 401.9   2. Hypercholesterolemia E78.00 272.0   3. Non-rheumatic mitral regurgitation I34.0 424.0   4. Coronary artery disease involving native coronary artery of native heart without angina pectoris I25.10 414.01     Problem List Items Addressed This Visit        Cardiac/Vascular    Essential (primary) hypertension - Primary    Relevant Orders    Comprehensive metabolic panel    Coronary artery disease involving native coronary artery of native heart without angina pectoris    Relevant Orders    Comprehensive metabolic panel    Lipid panel    Non-rheumatic mitral regurgitation    Relevant Orders    Comprehensive metabolic panel    Hypercholesterolemia    Relevant Orders    Comprehensive metabolic panel    Lipid panel           Plan:     ADD CRESTOR AND CO-Q-10, NO HCTZ/KCL, NOT TAKING, DC DIOVAN, NOT TAKING AND RE-CALL, ALL CV CLINICALLY  STABLE, NO ANGINA, NO HF, NO TIA, NO CLINICAL ARRHYTHMIA,CONTINUE CURRENT MEDS, EDUCATION, DIET, EXERCISERTC IN 6 MO WITH LABS      Essential (primary) hypertension  -     Comprehensive metabolic panel; Future; Expected date: 07/25/2018    Hypercholesterolemia  -     Comprehensive metabolic panel; Future; Expected date: 07/25/2018  -     Lipid panel; Future; Expected date: 07/25/2018    Non-rheumatic mitral regurgitation  -     Comprehensive metabolic panel; Future; Expected date: 07/25/2018    Coronary artery disease involving native coronary artery of native heart without angina pectoris  -     Comprehensive metabolic panel; Future; Expected date: 07/25/2018  -     Lipid panel; Future; Expected date: 07/25/2018    Other orders  -     rosuvastatin (CRESTOR) 5 MG tablet; Take 1 tablet (5 mg total) by mouth once daily.  Dispense: 90 tablet; Refill: 1    RTC Low level/low impact aerobic exercise 5x's/wk. Heart healthy diet and risk factor modification.    See labs and med orders.    Aerobic exercise 5x's/wk. Heart healthy diet and risk factor modification.    See labs and med orders.

## 2018-10-21 RX ORDER — ROSUVASTATIN CALCIUM 5 MG/1
5 TABLET, COATED ORAL DAILY
Qty: 90 TABLET | Refills: 1 | Status: SHIPPED | OUTPATIENT
Start: 2018-10-21 | End: 2019-02-13 | Stop reason: SDUPTHER

## 2019-02-13 ENCOUNTER — OFFICE VISIT (OUTPATIENT)
Dept: CARDIOLOGY | Facility: CLINIC | Age: 74
End: 2019-02-13
Payer: MEDICARE

## 2019-02-13 VITALS
OXYGEN SATURATION: 96 % | BODY MASS INDEX: 29.12 KG/M2 | SYSTOLIC BLOOD PRESSURE: 136 MMHG | HEIGHT: 65 IN | WEIGHT: 174.81 LBS | DIASTOLIC BLOOD PRESSURE: 86 MMHG | HEART RATE: 62 BPM

## 2019-02-13 DIAGNOSIS — I10 ESSENTIAL (PRIMARY) HYPERTENSION: ICD-10-CM

## 2019-02-13 DIAGNOSIS — I25.10 CORONARY ARTERY DISEASE INVOLVING NATIVE CORONARY ARTERY OF NATIVE HEART WITHOUT ANGINA PECTORIS: Primary | ICD-10-CM

## 2019-02-13 DIAGNOSIS — I34.0 NON-RHEUMATIC MITRAL REGURGITATION: ICD-10-CM

## 2019-02-13 DIAGNOSIS — E78.00 HYPERCHOLESTEROLEMIA: ICD-10-CM

## 2019-02-13 PROCEDURE — 99214 OFFICE O/P EST MOD 30 MIN: CPT | Mod: S$GLB,,, | Performed by: INTERNAL MEDICINE

## 2019-02-13 PROCEDURE — 99214 PR OFFICE/OUTPT VISIT, EST, LEVL IV, 30-39 MIN: ICD-10-PCS | Mod: S$GLB,,, | Performed by: INTERNAL MEDICINE

## 2019-02-13 RX ORDER — ROSUVASTATIN CALCIUM 5 MG/1
5 TABLET, COATED ORAL DAILY
Qty: 90 TABLET | Refills: 2 | Status: SHIPPED | OUTPATIENT
Start: 2019-02-13 | End: 2024-01-12

## 2019-02-13 RX ORDER — OMEGA-3-ACID ETHYL ESTERS 1 G/1
1 CAPSULE, LIQUID FILLED ORAL 2 TIMES DAILY
Refills: 5 | COMMUNITY
Start: 2019-01-14

## 2019-02-13 NOTE — PROGRESS NOTES
Subjective:    Patient ID:  Cat Meza is a 73 y.o. female who presents for Hypertension (labs); Hyperlipidemia; and Coronary Artery Disease        HPI  DISCUSSED LABS AND GOALS, LDL 53, HDL 57, CR 0.77, CMP OK, DOING WELL, NO CP, NO SOB, SEE ROS    Past Medical History:   Diagnosis Date    Acute coronary syndrome     LIGHT HEART ATTACK YEARS AGO ??    Bowel obstruction 2018    Cancer     breast    Cancer     lung    COPD (chronic obstructive pulmonary disease)     Coronary artery disease     Diabetes mellitus     Edema     Heart murmur     Hyperlipidemia     Hypertension     Thyroid disease     hypothyroid    Valvular regurgitation      Past Surgical History:   Procedure Laterality Date    BOWEL RESECTION  05/2018    BREAST SURGERY  2011    breast reconstruction    BREAST SURGERY  05/07/2018    implant exchange    CARDIAC CATHETERIZATION      CHOLECYSTECTOMY  5/31/2018    [facility] at [facility]    CHOLECYSTECTOMY, LAPAROSCOPIC- ATTEMPTED N/A 5/31/2018    [facility]    COLON SURGERY      ENTEROTOMY- REPAIR N/A 5/31/2018    [facility]    EXCISION, LIPOMA Right 5/31/2018    [facility]    EXCISION, SMALL INTESTINE and REPAIR OF JUJUNECTOMY  6/2/2018    [facility]    gall stones      HYSTERECTOMY      LAPAROTOMY,EXPLORATORY N/A 6/2/2018    [facility]    LYSIS, ADHESIONS  6/2/2018    [facility]    MASTECTOMY Bilateral      with breast implants bilat    RECONSTRUCTION-BREAST-Revision-Implant Exchange-Capsulotomy-Capsulectomy Bilateral 4/17/2018    Performed by Juan Diego Us [facility] at [facility]     Family History   Problem Relation Age of Onset    Heart disease Mother     Cancer Father      Social History     Socioeconomic History    Marital status:      Spouse name: Not on file     Number of children: Not on file    Years of education: Not on file    Highest education level: Not on file   Social Needs    Financial resource strain: Not on file    Food insecurity - worry: Not on file    Food insecurity - inability: Not on file    Transportation needs - medical: Not on file    Transportation needs - non-medical: Not on file   Occupational History    Not on file   Tobacco Use    Smoking status: Former Smoker     Last attempt to quit: 2002     Years since quittin.5    Smokeless tobacco: Never Used   Substance and Sexual Activity    Alcohol use: No    Drug use: No    Sexual activity: Not Currently   Other Topics Concern    Not on file   Social History Narrative    Not on file       Review of patient's allergies indicates:   Allergen Reactions    Adhesive     Niacin preparations     Penicillins     Phenergan [promethazine] Other (See Comments)     Could not wake up    Shellfish containing products     Ciprofloxacin Rash and Other (See Comments)     Pain at infusion site       Current Outpatient Medications:     amLODIPine (NORVASC) 5 MG tablet, Take 5 mg by mouth every evening., Disp: , Rfl:     ascorbic acid (VITAMIN C) 1000 MG tablet, Take 500 mg by mouth once daily. , Disp: , Rfl:     aspirin 81 MG Chew, Take 81 mg by mouth once daily., Disp: , Rfl:     coenzyme Q10 (CO Q-10) 200 mg capsule, Take 200 mg by mouth once daily., Disp: , Rfl:     cyanocobalamin (VITAMIN B-12) 1000 MCG tablet, Take 100 mcg by mouth once daily., Disp: , Rfl:     docusate sodium (COLACE) 100 MG capsule, Take 100 mg by mouth every evening., Disp: , Rfl:     ezetimibe (ZETIA) 10 mg tablet, Take 10 mg by mouth every evening. , Disp: , Rfl:     levothyroxine (SYNTHROID) 50 MCG tablet, Take 50 mcg by mouth once daily., Disp: , Rfl:     magnesium oxide 400 mg magnesium Tab, Take 400 mg by mouth once daily. , Disp: , Rfl:     metformin (GLUCOPHAGE) 500 MG tablet, Take 500 mg by mouth  daily with breakfast., Disp: , Rfl:     metoprolol succinate (TOPROL-XL) 25 MG 24 hr tablet, Take 25 mg by mouth every evening. , Disp: , Rfl:     omega-3 acid ethyl esters (LOVAZA) 1 gram capsule, Take 1 capsule by mouth 2 (two) times daily., Disp: , Rfl: 5    rosuvastatin (CRESTOR) 5 MG tablet, Take 1 tablet (5 mg total) by mouth once daily., Disp: 90 tablet, Rfl: 2    vitamin D 1000 units Tab, Take 2,000 Units by mouth once daily., Disp: , Rfl:     vitamin E 100 UNIT capsule, Take 100 Units by mouth once daily., Disp: , Rfl:     lactobacillus acidophilus & bulgar (LACTINEX) 100 million cell packet, Take 1 tablet by mouth every evening. , Disp: , Rfl:     LYSINE HCL ORAL, Take by mouth., Disp: , Rfl:     Review of Systems   Constitution: Negative for chills, diaphoresis, fever, weakness, malaise/fatigue and night sweats.   HENT: Negative for congestion and nosebleeds.    Eyes: Negative for blurred vision and visual disturbance.   Cardiovascular: Negative for chest pain, claudication, cyanosis, dyspnea on exertion (MILD AT TIMES,), irregular heartbeat, leg swelling, near-syncope, orthopnea, palpitations, paroxysmal nocturnal dyspnea and syncope.   Respiratory: Negative for cough, hemoptysis, shortness of breath and wheezing.    Endocrine: Negative for cold intolerance, heat intolerance and polyuria.   Hematologic/Lymphatic: Negative for adenopathy and bleeding problem. Does not bruise/bleed easily.   Skin: Negative for color change, itching and rash.   Musculoskeletal: Negative for arthritis (STABLE), back pain and falls.   Gastrointestinal: Negative for abdominal pain (BETTER), change in bowel habit, dysphagia, heartburn, hematemesis, jaundice, melena and vomiting.   Genitourinary: Negative for dysuria, flank pain and frequency.   Neurological: Negative for brief paralysis, dizziness, focal weakness, headaches, light-headedness and loss of balance.   Psychiatric/Behavioral: Negative for altered mental  "status, depression and memory loss. The patient is not nervous/anxious.    Allergic/Immunologic: Negative for environmental allergies and persistent infections.        Objective:      Vitals:    02/13/19 1037   BP: 136/86   Pulse: 62   SpO2: 96%   Weight: 79.3 kg (174 lb 13.2 oz)   Height: 5' 4.5" (1.638 m)   PainSc: 0-No pain     Body mass index is 29.55 kg/m².    Physical Exam   Constitutional: She is oriented to person, place, and time. She appears well-developed and well-nourished. She is active.   HENT:   Head: Normocephalic and atraumatic.   Mouth/Throat: Oropharynx is clear and moist and mucous membranes are normal.   Eyes: Conjunctivae and EOM are normal. Pupils are equal, round, and reactive to light.   Neck: Trachea normal and normal range of motion. Neck supple. Normal carotid pulses, no hepatojugular reflux and no JVD present. Carotid bruit is not present. No edema and no erythema present. No thyromegaly present.   Cardiovascular: Normal rate and regular rhythm.  No extrasystoles are present. PMI is not displaced. Exam reveals no gallop and no friction rub.   Murmur heard.   Systolic murmur is present with a grade of 2/6 at the lower left sternal border and apex.  Pulses:       Carotid pulses are 2+ on the right side, and 2+ on the left side.       Radial pulses are 2+ on the right side, and 2+ on the left side.        Femoral pulses are 2+ on the right side, and 2+ on the left side.       Dorsalis pedis pulses are 2+ on the right side, and 2+ on the left side.        Posterior tibial pulses are 2+ on the right side, and 2+ on the left side.   Mild varicose veins   Pulmonary/Chest: Effort normal and breath sounds normal. No tachypnea and no bradypnea. She has no wheezes. She has no rales. She exhibits no tenderness.   Abdominal: Soft. Bowel sounds are normal. She exhibits no distension and no mass. There is no hepatosplenomegaly. There is no CVA tenderness.   SCAR, DRESSING MIDDLE   Musculoskeletal: " Normal range of motion. She exhibits no edema or tenderness.   Lymphadenopathy:     She has no cervical adenopathy.   Neurological: She is alert and oriented to person, place, and time. She has normal strength. She displays no tremor. No cranial nerve deficit.   Skin: Skin is warm and dry. No cyanosis or erythema. No pallor.   Psychiatric: She has a normal mood and affect. Her speech is normal and behavior is normal.               ..    Chemistry        Component Value Date/Time     06/21/2018 1146    K 4.9 06/21/2018 1146     06/21/2018 1146    CO2 27 06/21/2018 1146    BUN 13 06/21/2018 1146    CREATININE 0.69 06/21/2018 1146    GLU 94 06/21/2018 1146        Component Value Date/Time    CALCIUM 9.8 06/21/2018 1146    ALKPHOS 73 06/21/2018 1146    AST 25 06/21/2018 1146    ALT 35 06/21/2018 1146    BILITOT 0.6 06/21/2018 1146    ESTGFRAFRICA >60 06/21/2018 1146    EGFRNONAA >60 06/21/2018 1146            ..No results found for: CHOL  No results found for: HDL  No results found for: LDLCALC  No results found for: TRIG  No results found for: CHOLHDL  ..  Lab Results   Component Value Date    WBC 11.57 06/21/2018    HGB 12.1 06/21/2018    HCT 37.3 06/21/2018    MCV 94 06/21/2018     (H) 06/21/2018       Test(s) Reviewed  I have reviewed the following in detail:  [] Stress test   [] Angiography   [] Echocardiogram   [x] Labs   [] Other:       Assessment:         ICD-10-CM ICD-9-CM   1. Coronary artery disease involving native coronary artery of native heart without angina pectoris I25.10 414.01   2. Non-rheumatic mitral regurgitation I34.0 424.0   3. Essential (primary) hypertension I10 401.9   4. Hypercholesterolemia E78.00 272.0     Problem List Items Addressed This Visit        Cardiac/Vascular    Essential (primary) hypertension    Relevant Orders    Comprehensive metabolic panel    Coronary artery disease involving native coronary artery of native heart without angina pectoris - Primary     Relevant Orders    Comprehensive metabolic panel    Non-rheumatic mitral regurgitation    Relevant Orders    Comprehensive metabolic panel    Hypercholesterolemia    Relevant Orders    Comprehensive metabolic panel           Plan:     ALL CV CLINICALLY STABLE, NO ANGINA, NO HF, NO TIA, NO CLINICAL ARRHYTHMIA,CONTINUE CURRENT MEDS, EDUCATION, DIET, EXERCISE, RTC IN 8 MO WITH LABS      Coronary artery disease involving native coronary artery of native heart without angina pectoris  -     Comprehensive metabolic panel; Future; Expected date: 02/13/2019    Non-rheumatic mitral regurgitation  -     Comprehensive metabolic panel; Future; Expected date: 02/13/2019    Essential (primary) hypertension  -     Comprehensive metabolic panel; Future; Expected date: 02/13/2019    Hypercholesterolemia  -     Comprehensive metabolic panel; Future; Expected date: 02/13/2019    Other orders  -     rosuvastatin (CRESTOR) 5 MG tablet; Take 1 tablet (5 mg total) by mouth once daily.  Dispense: 90 tablet; Refill: 2    RTC Low level/low impact aerobic exercise 5x's/wk. Heart healthy diet and risk factor modification.    See labs and med orders.    Aerobic exercise 5x's/wk. Heart healthy diet and risk factor modification.    See labs and med orders.

## 2019-04-24 ENCOUNTER — OFFICE VISIT (OUTPATIENT)
Dept: CARDIOLOGY | Facility: CLINIC | Age: 74
End: 2019-04-24
Payer: MEDICARE

## 2019-04-24 VITALS
SYSTOLIC BLOOD PRESSURE: 130 MMHG | OXYGEN SATURATION: 95 % | HEART RATE: 71 BPM | WEIGHT: 173.75 LBS | HEIGHT: 66 IN | DIASTOLIC BLOOD PRESSURE: 82 MMHG | BODY MASS INDEX: 27.92 KG/M2

## 2019-04-24 DIAGNOSIS — R07.2 PRECORDIAL PAIN: ICD-10-CM

## 2019-04-24 DIAGNOSIS — I34.0 NON-RHEUMATIC MITRAL REGURGITATION: ICD-10-CM

## 2019-04-24 DIAGNOSIS — R06.02 SOB (SHORTNESS OF BREATH): Primary | ICD-10-CM

## 2019-04-24 PROCEDURE — 99214 PR OFFICE/OUTPT VISIT, EST, LEVL IV, 30-39 MIN: ICD-10-PCS | Mod: S$GLB,,, | Performed by: INTERNAL MEDICINE

## 2019-04-24 PROCEDURE — 93000 ELECTROCARDIOGRAM COMPLETE: CPT | Mod: S$GLB,,, | Performed by: INTERNAL MEDICINE

## 2019-04-24 PROCEDURE — 99214 OFFICE O/P EST MOD 30 MIN: CPT | Mod: S$GLB,,, | Performed by: INTERNAL MEDICINE

## 2019-04-24 PROCEDURE — 93000 EKG 12-LEAD: ICD-10-PCS | Mod: S$GLB,,, | Performed by: INTERNAL MEDICINE

## 2019-04-24 RX ORDER — FENOFIBRATE 145 MG/1
145 TABLET, FILM COATED ORAL DAILY
COMMUNITY

## 2019-04-24 RX ORDER — NITROGLYCERIN 0.4 MG/1
0.4 TABLET SUBLINGUAL EVERY 5 MIN PRN
Qty: 25 TABLET | Refills: 0 | Status: SHIPPED | OUTPATIENT
Start: 2019-04-24 | End: 2024-01-12

## 2019-04-24 RX ORDER — CYCLOSPORINE 0.5 MG/ML
1 EMULSION OPHTHALMIC 2 TIMES DAILY
COMMUNITY

## 2019-04-24 NOTE — PROGRESS NOTES
Subjective:    Patient ID:  Cat Meza is a 73 y.o. female who presents for Chest Pain and Shortness of Breath        Chest Pain    This is a recurrent problem. The current episode started more than 1 month ago. The onset quality is gradual. The pain is present in the substernal region. The pain is at a severity of 3/10. The quality of the pain is described as dull. The pain radiates to the left jaw. Associated symptoms include shortness of breath. Pertinent negatives include no abdominal pain (BETTER), back pain, claudication, cough, diaphoresis, dizziness, fever, headaches, hemoptysis, irregular heartbeat, malaise/fatigue, near-syncope, orthopnea, palpitations, PND, syncope, vomiting or weakness.   Shortness of Breath   This is a chronic problem. The problem occurs daily. Associated symptoms include chest pain. Pertinent negatives include no abdominal pain (BETTER), claudication, fever, headaches, hemoptysis, leg swelling, orthopnea, PND, rash, syncope, vomiting or wheezing. The symptoms are aggravated by exercise. She has tried rest for the symptoms. The treatment provided moderate relief.     REQUESTED OV, INCREASED FATIGUE, SOB, FULLNESS IN SUBSTERNAL AREA, WITH CHEST TIGHTNESS, BETTER WITH OLD NITRO-PATCH, SAW PCP, HAD LABS AT [facility], SEE ROS    Past Medical History:   Diagnosis Date    Acute coronary syndrome     LIGHT HEART ATTACK YEARS AGO ??    Bowel obstruction 2018    Cancer     breast    Cancer     lung    COPD (chronic obstructive pulmonary disease)     Coronary artery disease     Diabetes mellitus     Edema     Heart murmur     Hyperlipidemia     Hypertension     Thyroid disease     hypothyroid    Valvular regurgitation      Past Surgical History:   Procedure Laterality Date    BOWEL RESECTION  05/2018    BREAST SURGERY  2011    breast reconstruction    BREAST SURGERY  05/07/2018    implant exchange    CARDIAC CATHETERIZATION      CHOLECYSTECTOMY  5/31/2018    Performed by Zev POLANCO  MD Luis Armando at [facility] OR    CHOLECYSTECTOMY, LAPAROSCOPIC- ATTEMPTED N/A 2018    Performed by Zev Durán MD at [facility] OR    COLON SURGERY      ENTEROTOMY- REPAIR N/A 2018    Performed by Zev Durán MD at [facility] OR    EXCISION, LIPOMA Right 2018    Performed by Zev Durán MD at [facility] OR    EXCISION, SMALL INTESTINE and REPAIR OF JUJUNECTOMY  2018    Performed by Zev Durán MD at [facility]    gall stones      HYSTERECTOMY      LAPAROTOMY,EXPLORATORY N/A 2018    Performed by Zev Durán MD at [facility] OR    LYSIS, ADHESIONS  2018    Performed by Zev Durán MD at [facility] OR    MASTECTOMY Bilateral      with breast implants bilat    RECONSTRUCTION-BREAST-Revision-Implant Exchange-Capsulotomy-Capsulectomy Bilateral 2018    Performed by Juan Diego Us MD at [facility] OR     Family History   Problem Relation Age of Onset    Heart disease Mother     Cancer Father      Social History     Socioeconomic History    Marital status:      Spouse name: Not on file    Number of children: Not on file    Years of education: Not on file    Highest education level: Not on file   Occupational History    Not on file   Social Needs    Financial resource strain: Not on file    Food insecurity:     Worry: Not on file     Inability: Not on file    Transportation needs:     Medical: Not on file     Non-medical: Not on file   Tobacco Use    Smoking status: Former Smoker     Last attempt to quit: 2002     Years since quittin.7    Smokeless tobacco: Never Used   Substance and Sexual Activity    Alcohol use: No    Drug use: No    Sexual activity: Not Currently   Lifestyle    Physical activity:     Days per week: Not on file     Minutes per session: Not on file    Stress: Not on file   Relationships    Social connections:     Talks on phone: Not on file     Gets together: Not on file     Attends Restorationism service: Not on file     Active member of club or organization:  Not on file     Attends meetings of clubs or organizations: Not on file     Relationship status: Not on file   Other Topics Concern    Not on file   Social History Narrative    Not on file       Review of patient's allergies indicates:   Allergen Reactions    Adhesive     Niacin preparations     Penicillins     Phenergan [promethazine] Other (See Comments)     Could not wake up    Shellfish containing products     Ciprofloxacin Rash and Other (See Comments)     Pain at infusion site       Current Outpatient Medications:     A-C-E-zinc ox-selen AA-copper (VISION FORMULA,V-I-I-ZN-SE-CU,) 1,000 unit-60 mg-30 unit Tab, Take 1 capsule by mouth., Disp: , Rfl:     amLODIPine (NORVASC) 5 MG tablet, Take 5 mg by mouth every evening., Disp: , Rfl:     ascorbic acid (VITAMIN C) 1000 MG tablet, Take 500 mg by mouth once daily. , Disp: , Rfl:     aspirin 81 MG Chew, Take 81 mg by mouth once daily., Disp: , Rfl:     coenzyme Q10 (CO Q-10) 200 mg capsule, Take 200 mg by mouth once daily., Disp: , Rfl:     cyanocobalamin (VITAMIN B-12) 1000 MCG tablet, Take 100 mcg by mouth once daily., Disp: , Rfl:     cycloSPORINE (RESTASIS) 0.05 % ophthalmic emulsion, 1 drop 2 (two) times daily., Disp: , Rfl:     ezetimibe (ZETIA) 10 mg tablet, Take 10 mg by mouth every evening. , Disp: , Rfl:     fenofibrate (TRICOR) 145 MG tablet, Take 145 mg by mouth once daily., Disp: , Rfl:     lactobacillus acidophilus & bulgar (LACTINEX) 100 million cell packet, Take 1 tablet by mouth every evening. , Disp: , Rfl:     levothyroxine (SYNTHROID) 50 MCG tablet, Take 50 mcg by mouth once daily., Disp: , Rfl:     magnesium oxide 400 mg magnesium Tab, Take 400 mg by mouth once daily. , Disp: , Rfl:     metformin (GLUCOPHAGE) 500 MG tablet, Take 500 mg by mouth daily with breakfast., Disp: , Rfl:     metoprolol succinate (TOPROL-XL) 25 MG 24 hr tablet, Take 25 mg by mouth every evening. , Disp: , Rfl:     rosuvastatin (CRESTOR) 5 MG  tablet, Take 1 tablet (5 mg total) by mouth once daily., Disp: 90 tablet, Rfl: 2    vitamin D 1000 units Tab, Take 2,000 Units by mouth once daily., Disp: , Rfl:     vitamin E 100 UNIT capsule, Take 100 Units by mouth once daily., Disp: , Rfl:     docusate sodium (COLACE) 100 MG capsule, Take 100 mg by mouth every evening., Disp: , Rfl:     LYSINE HCL ORAL, Take by mouth., Disp: , Rfl:     nitroGLYCERIN (NITROSTAT) 0.4 MG SL tablet, Place 1 tablet (0.4 mg total) under the tongue every 5 (five) minutes as needed., Disp: 25 tablet, Rfl: 0    omega-3 acid ethyl esters (LOVAZA) 1 gram capsule, Take 1 capsule by mouth 2 (two) times daily., Disp: , Rfl: 5    Review of Systems   Constitution: Negative for chills, diaphoresis, fever, malaise/fatigue and night sweats.   HENT: Negative for congestion and nosebleeds.    Eyes: Negative for blurred vision and visual disturbance.   Cardiovascular: Positive for chest pain and dyspnea on exertion (MOD). Negative for claudication, cyanosis, irregular heartbeat, leg swelling, near-syncope, orthopnea, palpitations, paroxysmal nocturnal dyspnea and syncope.   Respiratory: Positive for shortness of breath. Negative for cough, hemoptysis and wheezing.    Endocrine: Negative for cold intolerance, heat intolerance and polyuria.   Hematologic/Lymphatic: Negative for adenopathy and bleeding problem. Does not bruise/bleed easily.   Skin: Negative for color change, itching and rash.   Musculoskeletal: Negative for arthritis (STABLE), back pain and falls.   Gastrointestinal: Negative for abdominal pain (BETTER), change in bowel habit, dysphagia, heartburn, hematemesis, melena and vomiting.   Genitourinary: Negative for dysuria, flank pain and frequency.   Neurological: Negative for brief paralysis, dizziness, focal weakness, headaches, light-headedness, loss of balance and weakness.   Psychiatric/Behavioral: Negative for altered mental status, depression and memory loss. The patient is  "not nervous/anxious.    Allergic/Immunologic: Negative for hives and persistent infections.        Objective:      Vitals:    04/24/19 0908   BP: 130/82   Pulse: 71   SpO2: 95%   Weight: 78.8 kg (173 lb 11.6 oz)   Height: 5' 5.5" (1.664 m)   PainSc: 0-No pain     Body mass index is 28.47 kg/m².    Physical Exam   Constitutional: She is oriented to person, place, and time. She appears well-developed and well-nourished. She is active.   HENT:   Head: Normocephalic and atraumatic.   Mouth/Throat: Oropharynx is clear and moist and mucous membranes are normal.   Eyes: Pupils are equal, round, and reactive to light. Conjunctivae and EOM are normal.   Neck: Trachea normal and normal range of motion. Neck supple. Normal carotid pulses, no hepatojugular reflux and no JVD present. Carotid bruit is not present. No edema and no erythema present. No thyromegaly present.   Cardiovascular: Normal rate and regular rhythm.  No extrasystoles are present. PMI is not displaced. Exam reveals no gallop and no friction rub.   Murmur heard.   Systolic murmur is present with a grade of 2/6 at the lower left sternal border and apex.  Pulses:       Carotid pulses are 2+ on the right side, and 2+ on the left side.       Radial pulses are 2+ on the right side, and 2+ on the left side.        Femoral pulses are 2+ on the right side, and 2+ on the left side.       Dorsalis pedis pulses are 2+ on the right side, and 2+ on the left side.        Posterior tibial pulses are 2+ on the right side, and 2+ on the left side.   Mild varicose veins   Pulmonary/Chest: Effort normal and breath sounds normal. No tachypnea and no bradypnea. She has no wheezes. She has no rales. She exhibits no tenderness.   Abdominal: Soft. Bowel sounds are normal. She exhibits no distension and no mass. There is no hepatosplenomegaly. There is no CVA tenderness.   SCAR, DRESSING MIDDLE   Musculoskeletal: Normal range of motion. She exhibits no edema or tenderness. "   Lymphadenopathy:     She has no cervical adenopathy.   Neurological: She is alert and oriented to person, place, and time. She has normal strength. She displays no tremor. No cranial nerve deficit.   Skin: Skin is warm and dry. No cyanosis or erythema. No pallor.   Psychiatric: She has a normal mood and affect. Her speech is normal and behavior is normal.               ..    Chemistry        Component Value Date/Time     06/21/2018 1146    K 4.9 06/21/2018 1146     06/21/2018 1146    CO2 27 06/21/2018 1146    BUN 13 06/21/2018 1146    CREATININE 0.69 06/21/2018 1146    GLU 94 06/21/2018 1146        Component Value Date/Time    CALCIUM 9.8 06/21/2018 1146    ALKPHOS 73 06/21/2018 1146    AST 25 06/21/2018 1146    ALT 35 06/21/2018 1146    BILITOT 0.6 06/21/2018 1146    ESTGFRAFRICA >60 06/21/2018 1146    EGFRNONAA >60 06/21/2018 1146            ..No results found for: CHOL  No results found for: HDL  No results found for: LDLCALC  No results found for: TRIG  No results found for: CHOLHDL  ..  Lab Results   Component Value Date    WBC 11.57 06/21/2018    HGB 12.1 06/21/2018    HCT 37.3 06/21/2018    MCV 94 06/21/2018     (H) 06/21/2018       Test(s) Reviewed  I have reviewed the following in detail:  [] Stress test   [] Angiography   [] Echocardiogram   [] Labs   [x] Other:       Assessment:         ICD-10-CM ICD-9-CM   1. SOB (shortness of breath) R06.02 786.05   2. Precordial pain R07.2 786.51   3. Non-rheumatic mitral regurgitation I34.0 424.0     Problem List Items Addressed This Visit        Cardiac/Vascular    Non-rheumatic mitral regurgitation    Relevant Orders    Transthoracic echo (TTE) 2D with Color Flow    Stress test with myocardial perfusion    EKG 12-lead       Other    SOB (shortness of breath) - Primary    Relevant Orders    SCHEDULED EKG 12-LEAD (to Muse) (Completed)    Transthoracic echo (TTE) 2D with Color Flow    Stress test with myocardial perfusion    EKG 12-lead     Precordial pain    Relevant Orders    Transthoracic echo (TTE) 2D with Color Flow    Stress test with myocardial perfusion    EKG 12-lead           Plan:     EKG SR, NS T CHANGES,WILL GIVE  PRN SL NTG,NEEDS EVALUATION, WILL CHECK ECHO, AND NUCLEAR  STRESS TEST  SOON, ALL OTHER CV CLINICALLY STABLE, NO OVERT HF, NO TIA, NO CLINICAL ARRHYTHMIA,CONTINUE CURRENT MEDS, EDUCATION, DIET, EXERCISE, RTC AFTER TESTS      SOB (shortness of breath)  -     SCHEDULED EKG 12-LEAD (to Muse); Future  -     Transthoracic echo (TTE) 2D with Color Flow; Future  -     Stress test with myocardial perfusion; Future  -     EKG 12-lead    Precordial pain  -     Transthoracic echo (TTE) 2D with Color Flow; Future  -     Stress test with myocardial perfusion; Future  -     EKG 12-lead    Non-rheumatic mitral regurgitation  -     Transthoracic echo (TTE) 2D with Color Flow; Future  -     Stress test with myocardial perfusion; Future  -     EKG 12-lead    Other orders  -     nitroGLYCERIN (NITROSTAT) 0.4 MG SL tablet; Place 1 tablet (0.4 mg total) under the tongue every 5 (five) minutes as needed.  Dispense: 25 tablet; Refill: 0    RTC Low level/low impact aerobic exercise 5x's/wk. Heart healthy diet and risk factor modification.    See labs and med orders.    Aerobic exercise 5x's/wk. Heart healthy diet and risk factor modification.    See labs and med orders.

## 2019-05-08 ENCOUNTER — HOSPITAL ENCOUNTER (OUTPATIENT)
Dept: RADIOLOGY | Facility: HOSPITAL | Age: 74
Discharge: HOME | End: 2019-05-08
Attending: INTERNAL MEDICINE
Payer: MEDICARE

## 2019-05-08 DIAGNOSIS — R07.2 PRECORDIAL PAIN: ICD-10-CM

## 2019-05-08 DIAGNOSIS — R06.02 SOB (SHORTNESS OF BREATH): ICD-10-CM

## 2019-05-08 DIAGNOSIS — I34.0 NON-RHEUMATIC MITRAL REGURGITATION: ICD-10-CM

## 2019-05-08 LAB
CV STRESS BASE HR: 63 BPM
DIASTOLIC BLOOD PRESSURE: 73 MMHG
NUC REST EJECTION FRACTION: 80
OHS CV CPX 1 MINUTE RECOVERY HEART RATE: 88 BPM
OHS CV CPX 85 PERCENT MAX PREDICTED HEART RATE MALE: 120
OHS CV CPX MAX PREDICTED HEART RATE: 142
OHS CV CPX PATIENT IS FEMALE: 1
OHS CV CPX PATIENT IS MALE: 0
OHS CV CPX PEAK DIASTOLIC BLOOD PRESSURE: 73 MMHG
OHS CV CPX PEAK HEAR RATE: 91 BPM
OHS CV CPX PEAK RATE PRESSURE PRODUCT: NORMAL
OHS CV CPX PEAK SYSTOLIC BLOOD PRESSURE: 127 MMHG
OHS CV CPX PERCENT MAX PREDICTED HEART RATE ACHIEVED: 64
OHS CV CPX RATE PRESSURE PRODUCT PRESENTING: 8001
SYSTOLIC BLOOD PRESSURE: 127 MMHG

## 2019-05-08 PROCEDURE — 93016 CV STRESS TEST SUPVJ ONLY: CPT | Mod: ,,, | Performed by: INTERNAL MEDICINE

## 2019-05-08 PROCEDURE — 78452 HT MUSCLE IMAGE SPECT MULT: CPT | Mod: 26,,, | Performed by: INTERNAL MEDICINE

## 2019-05-08 PROCEDURE — 93018 CV STRESS TEST I&R ONLY: CPT | Mod: ,,, | Performed by: INTERNAL MEDICINE

## 2019-05-08 PROCEDURE — 93018 PR CARDIAC STRESS TST,INTERP/REPT ONLY: ICD-10-PCS | Mod: ,,, | Performed by: INTERNAL MEDICINE

## 2019-05-08 PROCEDURE — 93016 STRESS TEST WITH MYOCARDIAL PERFUSION (CUPID ONLY): ICD-10-PCS | Mod: ,,, | Performed by: INTERNAL MEDICINE

## 2019-05-08 PROCEDURE — 78452 STRESS TEST WITH MYOCARDIAL PERFUSION (CUPID ONLY): ICD-10-PCS | Mod: 26,,, | Performed by: INTERNAL MEDICINE

## 2019-05-09 ENCOUNTER — TELEPHONE (OUTPATIENT)
Dept: CARDIOLOGY | Facility: CLINIC | Age: 74
End: 2019-05-09

## 2019-05-09 NOTE — TELEPHONE ENCOUNTER
----- Message from Allyson Verduzco MD sent at 5/9/2019  8:44 AM CDT -----  Imaging tests results are within normal parameters.  Keep your follow up appointment.

## 2019-05-23 ENCOUNTER — TELEPHONE (OUTPATIENT)
Dept: VASCULAR SURGERY | Facility: CLINIC | Age: 74
End: 2019-05-23

## 2019-05-23 NOTE — TELEPHONE ENCOUNTER
----- Message from Gabriella Guzman sent at 5/23/2019  9:40 AM CDT -----  Contact: patient  Type:  Sooner Apoointment Request    Caller is requesting a sooner appointment.  Caller declined first available appointment listed below.  Caller will not accept being placed on the waitlist and is requesting a message be sent to doctor.    Name of Caller:  Patient  When is the first available appointment?  ?  Symptoms:  varicose veins  Best Call Back Number:  [phone]  Additional Information:  Please call to advise and schedule. Thanks!

## 2019-05-23 NOTE — TELEPHONE ENCOUNTER
----- Message from Lena Pina sent at 5/23/2019  2:09 PM CDT -----  Type: Appointment Request    Caller is requesting an appointment.      Name of Caller:  Patient  When is the first available appointment? NA  Symptoms: Varicose Veins  Best Call Back Number:  [phone]  Additional Information:  Referred by her oncologist

## 2019-10-16 ENCOUNTER — OFFICE VISIT (OUTPATIENT)
Dept: CARDIOLOGY | Facility: CLINIC | Age: 74
End: 2019-10-16
Payer: MEDICARE

## 2019-10-16 VITALS
OXYGEN SATURATION: 96 % | HEIGHT: 66 IN | BODY MASS INDEX: 28.7 KG/M2 | HEART RATE: 59 BPM | WEIGHT: 178.56 LBS | SYSTOLIC BLOOD PRESSURE: 132 MMHG | DIASTOLIC BLOOD PRESSURE: 68 MMHG

## 2019-10-16 DIAGNOSIS — I25.118 CORONARY ARTERY DISEASE OF NATIVE ARTERY OF NATIVE HEART WITH STABLE ANGINA PECTORIS: Primary | ICD-10-CM

## 2019-10-16 DIAGNOSIS — E78.00 HYPERCHOLESTEROLEMIA: ICD-10-CM

## 2019-10-16 DIAGNOSIS — I10 ESSENTIAL (PRIMARY) HYPERTENSION: ICD-10-CM

## 2019-10-16 DIAGNOSIS — I51.89 DIASTOLIC DYSFUNCTION WITHOUT HEART FAILURE: ICD-10-CM

## 2019-10-16 PROCEDURE — 99214 PR OFFICE/OUTPT VISIT, EST, LEVL IV, 30-39 MIN: ICD-10-PCS | Mod: S$GLB,,, | Performed by: INTERNAL MEDICINE

## 2019-10-16 PROCEDURE — 99214 OFFICE O/P EST MOD 30 MIN: CPT | Mod: S$GLB,,, | Performed by: INTERNAL MEDICINE

## 2019-10-16 RX ORDER — AMLODIPINE BESYLATE 5 MG/1
5 TABLET ORAL NIGHTLY
Qty: 90 TABLET | Refills: 1 | Status: SHIPPED | OUTPATIENT
Start: 2019-10-16 | End: 2020-10-05

## 2019-10-16 NOTE — PROGRESS NOTES
Subjective:    Patient ID:  Cat Meza is a 73 y.o. female who presents for Coronary Artery Disease (labs at [place] ); Hypertension; and Hyperlipidemia        HPI  DISCUSSED TESTS, S/P ER AT [facility] IN [place] WITH L ARM PAIN, RECORDS REVIEWED, TENDONITIS, BMP /TSH. CPK OK, RECENT STRESS TEST  NORMAL,ECHO DIASTOLIC DYSFUNCTION, NO TIA TYPE SYMPTOMS, NO NEAR-SYNCOPE, NO SIGNIFICANT SHORTNESS OF BREATH, NO PALPITATIONS, SEE ROS    Past Medical History:   Diagnosis Date    Acute coronary syndrome     LIGHT HEART ATTACK YEARS AGO ??    Bowel obstruction 2018    Cancer     breast    Cancer     lung    COPD (chronic obstructive pulmonary disease)     Coronary artery disease     Diabetes mellitus     Edema     Heart murmur     Hyperlipidemia     Hypertension     Thyroid disease     hypothyroid    Valvular regurgitation      Past Surgical History:   Procedure Laterality Date    BOWEL RESECTION  05/2018    BREAST SURGERY  2011    breast reconstruction    BREAST SURGERY  05/07/2018    implant exchange    CARDIAC CATHETERIZATION      CHOLECYSTECTOMY  5/31/2018    Procedure: CHOLECYSTECTOMY;  Surgeon: Zev Durán MD;  Location: [facility] OR;  Service: General;;  opened at 1010      COLON SURGERY      gall stones      HYSTERECTOMY      INCISION OF INTESTINE N/A 5/31/2018    Procedure: ENTEROTOMY- REPAIR;  Surgeon: Zev Durán MD;  Location: [facility] OR;  Service: General;  Laterality: N/A;    LAPAROSCOPIC CHOLECYSTECTOMY N/A 5/31/2018    Procedure: CHOLECYSTECTOMY, LAPAROSCOPIC- ATTEMPTED;  Surgeon: Zev Durán MD;  Location: [facility] OR;  Service: General;  Laterality: N/A;  attempted    LIPOMA RESECTION Right 5/31/2018    Procedure: EXCISION, LIPOMA;  Surgeon: Zev Durán MD;  Location: [facility] OR;  Service: General;  Laterality: Right;    LYSIS OF ADHESIONS  6/2/2018    Procedure: LYSIS, ADHESIONS;  Surgeon: Zev Durán MD;  Location: [facility] OR;  Service: General;;    MASTECTOMY Bilateral       with breast implants bilat     Family History   Problem Relation Age of Onset    Heart disease Mother     Cancer Father      Social History     Socioeconomic History    Marital status:      Spouse name: Not on file    Number of children: Not on file    Years of education: Not on file    Highest education level: Not on file   Occupational History    Not on file   Social Needs    Financial resource strain: Not on file    Food insecurity:     Worry: Not on file     Inability: Not on file    Transportation needs:     Medical: Not on file     Non-medical: Not on file   Tobacco Use    Smoking status: Former Smoker     Last attempt to quit: 2002     Years since quittin.1    Smokeless tobacco: Never Used   Substance and Sexual Activity    Alcohol use: No    Drug use: No    Sexual activity: Not Currently   Lifestyle    Physical activity:     Days per week: Not on file     Minutes per session: Not on file    Stress: Not on file   Relationships    Social connections:     Talks on phone: Not on file     Gets together: Not on file     Attends Mandaeism service: Not on file     Active member of club or organization: Not on file     Attends meetings of clubs or organizations: Not on file     Relationship status: Not on file   Other Topics Concern    Not on file   Social History Narrative    Not on file       Review of patient's allergies indicates:   Allergen Reactions    Adhesive     Niacin preparations     Penicillins     Phenergan [promethazine] Other (See Comments)     Could not wake up    Shellfish containing products     Ciprofloxacin Rash and Other (See Comments)     Pain at infusion site       Current Outpatient Medications:     A-C-E-zinc ox-selen AA-copper (VISION FORMULA,Y-L-D-ZN-SE-CU,) 1,000 unit-60 mg-30 unit Tab, Take 1 capsule by mouth., Disp: , Rfl:     amLODIPine (NORVASC) 5 MG tablet, Take 1 tablet (5 mg total) by mouth every evening., Disp: 90 tablet, Rfl: 1     ascorbic acid (VITAMIN C) 1000 MG tablet, Take 500 mg by mouth once daily. , Disp: , Rfl:     aspirin 81 MG Chew, Take 81 mg by mouth once daily., Disp: , Rfl:     coenzyme Q10 (CO Q-10) 200 mg capsule, Take 200 mg by mouth once daily., Disp: , Rfl:     cyanocobalamin (VITAMIN B-12) 1000 MCG tablet, Take 100 mcg by mouth once daily., Disp: , Rfl:     cycloSPORINE (RESTASIS) 0.05 % ophthalmic emulsion, 1 drop 2 (two) times daily., Disp: , Rfl:     docusate sodium (COLACE) 100 MG capsule, Take 100 mg by mouth every evening., Disp: , Rfl:     ezetimibe (ZETIA) 10 mg tablet, Take 10 mg by mouth every evening. , Disp: , Rfl:     fenofibrate (TRICOR) 145 MG tablet, Take 145 mg by mouth once daily., Disp: , Rfl:     lactobacillus acidophilus & bulgar (LACTINEX) 100 million cell packet, Take 1 tablet by mouth every evening. , Disp: , Rfl:     levothyroxine (SYNTHROID) 50 MCG tablet, Take 50 mcg by mouth once daily., Disp: , Rfl:     magnesium oxide 400 mg magnesium Tab, Take 400 mg by mouth once daily. , Disp: , Rfl:     metformin (GLUCOPHAGE) 500 MG tablet, Take 500 mg by mouth daily with breakfast., Disp: , Rfl:     metoprolol succinate (TOPROL-XL) 25 MG 24 hr tablet, Take 25 mg by mouth every evening. , Disp: , Rfl:     nitroGLYCERIN (NITROSTAT) 0.4 MG SL tablet, Place 1 tablet (0.4 mg total) under the tongue every 5 (five) minutes as needed., Disp: 25 tablet, Rfl: 0    omega-3 acid ethyl esters (LOVAZA) 1 gram capsule, Take 1 capsule by mouth 2 (two) times daily., Disp: , Rfl: 5    rosuvastatin (CRESTOR) 5 MG tablet, Take 1 tablet (5 mg total) by mouth once daily., Disp: 90 tablet, Rfl: 2    vitamin D 1000 units Tab, Take 2,000 Units by mouth once daily., Disp: , Rfl:     vitamin E 100 UNIT capsule, Take 100 Units by mouth once daily., Disp: , Rfl:     LYSINE HCL ORAL, Take by mouth., Disp: , Rfl:     Review of Systems   Constitution: Negative for chills, diaphoresis, fever, malaise/fatigue and night  "sweats. Weight gain: SOME.   HENT: Negative for congestion (ALLERGY) and nosebleeds.    Eyes: Negative for blurred vision and visual disturbance.   Cardiovascular: Negative for chest pain, claudication, cyanosis, dyspnea on exertion (MILD), irregular heartbeat, leg swelling, near-syncope, orthopnea, palpitations, paroxysmal nocturnal dyspnea and syncope.   Respiratory: Negative for cough, hemoptysis, shortness of breath and wheezing.    Endocrine: Negative for cold intolerance, heat intolerance and polyuria.   Hematologic/Lymphatic: Negative for adenopathy and bleeding problem. Does not bruise/bleed easily.   Skin: Negative for color change, itching and rash.   Musculoskeletal: Negative for arthritis (STABLE), back pain and falls.   Gastrointestinal: Negative for abdominal pain (OCC), change in bowel habit, dysphagia, heartburn (CHRONIC), hematemesis, melena and vomiting.   Genitourinary: Negative for dysuria, flank pain and frequency.   Neurological: Negative for brief paralysis, dizziness, focal weakness, headaches, light-headedness, loss of balance and weakness.   Psychiatric/Behavioral: Negative for altered mental status, depression and memory loss. The patient is not nervous/anxious.    Allergic/Immunologic: Negative for hives and persistent infections.        Objective:      Vitals:    10/16/19 1017   BP: 132/68   Pulse: (!) 59   SpO2: 96%   Weight: 81 kg (178 lb 9.2 oz)   Height: 5' 5.5" (1.664 m)   PainSc: 0-No pain     Body mass index is 29.26 kg/m².    Physical Exam   Constitutional: She is oriented to person, place, and time. She appears well-developed and well-nourished. She is active.   HENT:   Head: Normocephalic and atraumatic.   Mouth/Throat: Oropharynx is clear and moist and mucous membranes are normal.   Eyes: Pupils are equal, round, and reactive to light. Conjunctivae and EOM are normal.   Neck: Trachea normal and normal range of motion. Neck supple. Normal carotid pulses, no hepatojugular reflux " and no JVD present. Carotid bruit is not present. No edema and no erythema present. No thyromegaly present.   Cardiovascular: Normal rate and regular rhythm.  No extrasystoles are present. PMI is not displaced. Exam reveals no gallop and no friction rub.   Murmur heard.   Systolic murmur is present with a grade of 2/6 at the lower left sternal border and apex.  Pulses:       Carotid pulses are 2+ on the right side, and 2+ on the left side.       Radial pulses are 2+ on the right side, and 2+ on the left side.        Femoral pulses are 2+ on the right side, and 2+ on the left side.       Dorsalis pedis pulses are 2+ on the right side, and 2+ on the left side.        Posterior tibial pulses are 2+ on the right side, and 2+ on the left side.   Pulmonary/Chest: Effort normal and breath sounds normal. No tachypnea and no bradypnea. She has no wheezes. She has no rales. She exhibits no tenderness.   Abdominal: Soft. Bowel sounds are normal. She exhibits no distension and no mass. There is no hepatosplenomegaly. There is no CVA tenderness.   SCAR   Musculoskeletal: Normal range of motion. She exhibits no edema or tenderness.   Lymphadenopathy:     She has no cervical adenopathy.   Neurological: She is alert and oriented to person, place, and time. She has normal strength. She displays no tremor. No cranial nerve deficit.   Skin: Skin is warm and dry. No cyanosis or erythema. No pallor.   Psychiatric: She has a normal mood and affect. Her speech is normal and behavior is normal.               ..    Chemistry        Component Value Date/Time     06/21/2018 1146    K 4.9 06/21/2018 1146     06/21/2018 1146    CO2 27 06/21/2018 1146    BUN 13 06/21/2018 1146    CREATININE 0.69 06/21/2018 1146    GLU 94 06/21/2018 1146        Component Value Date/Time    CALCIUM 9.8 06/21/2018 1146    ALKPHOS 73 06/21/2018 1146    AST 25 06/21/2018 1146    ALT 35 06/21/2018 1146    BILITOT 0.6 06/21/2018 1146    ESTGFRAFRICA >60  06/21/2018 1146    EGFRNONAA >60 06/21/2018 1146            ..No results found for: CHOL  No results found for: HDL  No results found for: LDLCALC  No results found for: TRIG  No results found for: CHOLHDL  ..  Lab Results   Component Value Date    WBC 11.57 06/21/2018    HGB 12.1 06/21/2018    HCT 37.3 06/21/2018    MCV 94 06/21/2018     (H) 06/21/2018       Test(s) Reviewed  I have reviewed the following in detail:  [x] Stress test   [] Angiography   [x] Echocardiogram   [x] Labs   [x] Other:       Assessment:         ICD-10-CM ICD-9-CM   1. Coronary artery disease of native artery of native heart with stable angina pectoris I25.118 414.01     413.9   2. Essential (primary) hypertension I10 401.9   3. Diastolic dysfunction without heart failure I51.89 429.9   4. Hypercholesterolemia E78.00 272.0     Problem List Items Addressed This Visit        Cardiac/Vascular    Essential (primary) hypertension    Relevant Orders    Comprehensive metabolic panel    Coronary artery disease of native artery of native heart with stable angina pectoris - Primary    Relevant Orders    Comprehensive metabolic panel    Hypercholesterolemia    Relevant Orders    Comprehensive metabolic panel    Lipid panel    Diastolic dysfunction without heart failure    Relevant Orders    Comprehensive metabolic panel           Plan:         ALL CV CLINICALLY STABLE, CLASS 0-1 ANGINA, NO HF, NO TIA, NO CLINICAL ARRHYTHMIA,CONTINUE CURRENT MEDS, EDUCATION, DIET, EXERCISE, RETURN TO CLINIC IN 6 MO WITH LABS  Coronary artery disease of native artery of native heart with stable angina pectoris  -     Comprehensive metabolic panel; Future; Expected date: 10/16/2019    Essential (primary) hypertension  -     Comprehensive metabolic panel; Future; Expected date: 10/16/2019    Diastolic dysfunction without heart failure  -     Comprehensive metabolic panel; Future; Expected date: 10/16/2019    Hypercholesterolemia  -     Comprehensive metabolic panel;  Future; Expected date: 10/16/2019  -     Lipid panel; Future; Expected date: 10/16/2019    Other orders  -     amLODIPine (NORVASC) 5 MG tablet; Take 1 tablet (5 mg total) by mouth every evening.  Dispense: 90 tablet; Refill: 1    RTC Low level/low impact aerobic exercise 5x's/wk. Heart healthy diet and risk factor modification.    See labs and med orders.    Aerobic exercise 5x's/wk. Heart healthy diet and risk factor modification.    See labs and med orders.

## 2020-06-29 ENCOUNTER — TELEPHONE (OUTPATIENT)
Dept: CARDIOLOGY | Facility: CLINIC | Age: 75
End: 2020-06-29

## 2020-06-29 DIAGNOSIS — I34.0 NON-RHEUMATIC MITRAL REGURGITATION: ICD-10-CM

## 2020-06-29 DIAGNOSIS — I25.118 CORONARY ARTERY DISEASE OF NATIVE ARTERY OF NATIVE HEART WITH STABLE ANGINA PECTORIS: Primary | ICD-10-CM

## 2020-06-29 DIAGNOSIS — E78.00 HYPERCHOLESTEROLEMIA: ICD-10-CM

## 2020-06-29 NOTE — TELEPHONE ENCOUNTER
----- Message from Doreen Johnson sent at 6/29/2020  3:27 PM CDT -----  Regarding: lab orders  Type: Needs Medical Advice  Who Called:  pt  Symptoms (please be specific):    How long has patient had these symptoms:    Pharmacy name and phone #:    Best Call Back Number:[phone] (home)     Additional Information: pt states she needs to have lab orders put in the system so she can have them completed prior to coming to schedule appt July 8. Pls call to advise

## 2020-07-02 ENCOUNTER — CLINICAL SUPPORT (OUTPATIENT)
Dept: FAMILY MEDICINE | Facility: CLINIC | Age: 75
End: 2020-07-02
Payer: MEDICARE

## 2020-07-02 DIAGNOSIS — E78.00 HYPERCHOLESTEROLEMIA: ICD-10-CM

## 2020-07-02 DIAGNOSIS — I25.118 CORONARY ARTERY DISEASE OF NATIVE ARTERY OF NATIVE HEART WITH STABLE ANGINA PECTORIS: ICD-10-CM

## 2020-07-02 DIAGNOSIS — I34.0 NON-RHEUMATIC MITRAL REGURGITATION: ICD-10-CM

## 2020-07-02 PROCEDURE — 36415 COLL VENOUS BLD VENIPUNCTURE: CPT | Mod: S$GLB,,, | Performed by: INTERNAL MEDICINE

## 2020-07-02 PROCEDURE — 80053 COMPREHEN METABOLIC PANEL: CPT

## 2020-07-02 PROCEDURE — 36415 PR COLLECTION VENOUS BLOOD,VENIPUNCTURE: ICD-10-PCS | Mod: S$GLB,,, | Performed by: INTERNAL MEDICINE

## 2020-07-02 PROCEDURE — 80061 LIPID PANEL: CPT

## 2020-07-03 LAB
ALBUMIN SERPL BCP-MCNC: 3.9 G/DL (ref 3.5–5.2)
ALP SERPL-CCNC: 54 U/L (ref 55–135)
ALT SERPL W/O P-5'-P-CCNC: 28 U/L (ref 10–44)
ANION GAP SERPL CALC-SCNC: 9 MMOL/L (ref 8–16)
AST SERPL-CCNC: 23 U/L (ref 10–40)
BILIRUB SERPL-MCNC: 0.6 MG/DL (ref 0.1–1)
BUN SERPL-MCNC: 12 MG/DL (ref 8–23)
CALCIUM SERPL-MCNC: 8.9 MG/DL (ref 8.7–10.5)
CHLORIDE SERPL-SCNC: 108 MMOL/L (ref 95–110)
CHOLEST SERPL-MCNC: 127 MG/DL (ref 120–199)
CHOLEST/HDLC SERPL: 2.6 {RATIO} (ref 2–5)
CO2 SERPL-SCNC: 25 MMOL/L (ref 23–29)
CREAT SERPL-MCNC: 0.8 MG/DL (ref 0.5–1.4)
EST. GFR  (AFRICAN AMERICAN): >60 ML/MIN/1.73 M^2
EST. GFR  (NON AFRICAN AMERICAN): >60 ML/MIN/1.73 M^2
GLUCOSE SERPL-MCNC: 146 MG/DL (ref 70–110)
HDLC SERPL-MCNC: 48 MG/DL (ref 40–75)
HDLC SERPL: 37.8 % (ref 20–50)
LDLC SERPL CALC-MCNC: 54.6 MG/DL (ref 63–159)
NONHDLC SERPL-MCNC: 79 MG/DL
POTASSIUM SERPL-SCNC: 4.2 MMOL/L (ref 3.5–5.1)
PROT SERPL-MCNC: 7.2 G/DL (ref 6–8.4)
SODIUM SERPL-SCNC: 142 MMOL/L (ref 136–145)
TRIGL SERPL-MCNC: 122 MG/DL (ref 30–150)

## 2020-07-08 ENCOUNTER — OFFICE VISIT (OUTPATIENT)
Dept: CARDIOLOGY | Facility: CLINIC | Age: 75
End: 2020-07-08
Payer: MEDICARE

## 2020-07-08 VITALS
SYSTOLIC BLOOD PRESSURE: 138 MMHG | HEART RATE: 74 BPM | WEIGHT: 182.31 LBS | DIASTOLIC BLOOD PRESSURE: 88 MMHG | BODY MASS INDEX: 29.3 KG/M2 | HEIGHT: 66 IN | OXYGEN SATURATION: 96 %

## 2020-07-08 DIAGNOSIS — I25.118 CORONARY ARTERY DISEASE OF NATIVE ARTERY OF NATIVE HEART WITH STABLE ANGINA PECTORIS: Primary | ICD-10-CM

## 2020-07-08 DIAGNOSIS — I10 ESSENTIAL (PRIMARY) HYPERTENSION: ICD-10-CM

## 2020-07-08 DIAGNOSIS — E78.00 HYPERCHOLESTEROLEMIA: ICD-10-CM

## 2020-07-08 DIAGNOSIS — I34.0 NON-RHEUMATIC MITRAL REGURGITATION: ICD-10-CM

## 2020-07-08 DIAGNOSIS — R09.89 BRUIT OF LEFT CAROTID ARTERY: ICD-10-CM

## 2020-07-08 PROCEDURE — 99214 PR OFFICE/OUTPT VISIT, EST, LEVL IV, 30-39 MIN: ICD-10-PCS | Mod: S$GLB,,, | Performed by: INTERNAL MEDICINE

## 2020-07-08 PROCEDURE — 99214 OFFICE O/P EST MOD 30 MIN: CPT | Mod: S$GLB,,, | Performed by: INTERNAL MEDICINE

## 2020-07-08 RX ORDER — HYDROCHLOROTHIAZIDE 12.5 MG/1
12.5 TABLET ORAL EVERY OTHER DAY
Qty: 45 TABLET | Refills: 1 | Status: SHIPPED | OUTPATIENT
Start: 2020-07-08 | End: 2024-01-12

## 2020-07-08 RX ORDER — POTASSIUM CHLORIDE 750 MG/1
10 TABLET, EXTENDED RELEASE ORAL EVERY OTHER DAY
Qty: 45 TABLET | Refills: 1 | Status: SHIPPED | OUTPATIENT
Start: 2020-07-08

## 2020-07-08 NOTE — PROGRESS NOTES
Subjective:    Patient ID:  Cat Meza is a 74 y.o. female who presents for Coronary Artery Disease (labs), Hypertension, Hyperlipidemia, and Valvular Heart Disease        HPI  DISCUSSED LABS AND GOALS LDL 54, CMP OK, , BP ON UPPER END, NO CHEST PAIN NO SIGNIFICANT SHORTNESS OF BREATH NO TIA TYPE SYMPTOMS NO NEAR-SYNCOPE, SEE ROS    Past Medical History:   Diagnosis Date    Acute coronary syndrome     LIGHT HEART ATTACK YEARS AGO ??    Bowel obstruction 2018    Cancer     breast    Cancer     lung    COPD (chronic obstructive pulmonary disease)     Coronary artery disease     Diabetes mellitus     Edema     Heart murmur     Hyperlipidemia     Hypertension     Thyroid disease     hypothyroid    Valvular regurgitation      Past Surgical History:   Procedure Laterality Date    BOWEL RESECTION  05/2018    BREAST SURGERY  2011    breast reconstruction    BREAST SURGERY  05/07/2018    implant exchange    CARDIAC CATHETERIZATION      CHOLECYSTECTOMY  5/31/2018    Procedure: CHOLECYSTECTOMY;  Surgeon: Zev Durán MD;  Location: [facility] OR;  Service: General;;  opened at 1010      COLON SURGERY      gall stones      HYSTERECTOMY      INCISION OF INTESTINE N/A 5/31/2018    Procedure: ENTEROTOMY- REPAIR;  Surgeon: Zev Durán MD;  Location: [facility] OR;  Service: General;  Laterality: N/A;    LAPAROSCOPIC CHOLECYSTECTOMY N/A 5/31/2018    Procedure: CHOLECYSTECTOMY, LAPAROSCOPIC- ATTEMPTED;  Surgeon: Zev Durán MD;  Location: [facility] OR;  Service: General;  Laterality: N/A;  attempted    LIPOMA RESECTION Right 5/31/2018    Procedure: EXCISION, LIPOMA;  Surgeon: Zev Durán MD;  Location: [facility] OR;  Service: General;  Laterality: Right;    LYSIS OF ADHESIONS  6/2/2018    Procedure: LYSIS, ADHESIONS;  Surgeon: Zev Durán MD;  Location: [facility] OR;  Service: General;;    MASTECTOMY Bilateral      with breast implants bilat     Family History   Problem Relation Age of Onset    Heart disease  Mother     Cancer Father      Social History     Socioeconomic History    Marital status:      Spouse name: Not on file    Number of children: Not on file    Years of education: Not on file    Highest education level: Not on file   Occupational History    Not on file   Social Needs    Financial resource strain: Not on file    Food insecurity     Worry: Not on file     Inability: Not on file    Transportation needs     Medical: Not on file     Non-medical: Not on file   Tobacco Use    Smoking status: Former Smoker     Quit date: 2002     Years since quittin.9    Smokeless tobacco: Never Used   Substance and Sexual Activity    Alcohol use: No    Drug use: No    Sexual activity: Not Currently   Lifestyle    Physical activity     Days per week: Not on file     Minutes per session: Not on file    Stress: Not on file   Relationships    Social connections     Talks on phone: Not on file     Gets together: Not on file     Attends [employer] service: Not on file     Active member of club or organization: Not on file     Attends meetings of clubs or organizations: Not on file     Relationship status: Not on file   Other Topics Concern    Not on file   Social History Narrative    Not on file       Review of patient's allergies indicates:   Allergen Reactions    Adhesive     Niacin preparations     Penicillins     Phenergan [promethazine] Other (See Comments)     Could not wake up    Shellfish containing products     Ciprofloxacin Rash and Other (See Comments)     Pain at infusion site       Current Outpatient Medications:     amLODIPine (NORVASC) 5 MG tablet, Take 1 tablet (5 mg total) by mouth every evening., Disp: 90 tablet, Rfl: 1    ascorbic acid (VITAMIN C) 1000 MG tablet, Take 500 mg by mouth once daily. , Disp: , Rfl:     aspirin 81 MG Chew, Take 81 mg by mouth once daily., Disp: , Rfl:     coenzyme Q10 (CO Q-10) 200 mg capsule, Take 200 mg by mouth once daily., Disp: , Rfl:      cyanocobalamin (VITAMIN B-12) 1000 MCG tablet, Take 100 mcg by mouth once daily., Disp: , Rfl:     cycloSPORINE (RESTASIS) 0.05 % ophthalmic emulsion, 1 drop 2 (two) times daily., Disp: , Rfl:     docusate sodium (COLACE) 100 MG capsule, Take 100 mg by mouth every evening., Disp: , Rfl:     ezetimibe (ZETIA) 10 mg tablet, Take 10 mg by mouth every evening. , Disp: , Rfl:     fenofibrate (TRICOR) 145 MG tablet, Take 145 mg by mouth once daily., Disp: , Rfl:     lactobacillus acidophilus & bulgar (LACTINEX) 100 million cell packet, Take 1 tablet by mouth every evening. , Disp: , Rfl:     levothyroxine (SYNTHROID) 50 MCG tablet, Take 50 mcg by mouth once daily., Disp: , Rfl:     magnesium oxide 400 mg magnesium Tab, Take 400 mg by mouth once daily. , Disp: , Rfl:     metformin (GLUCOPHAGE) 500 MG tablet, Take 500 mg by mouth daily with breakfast., Disp: , Rfl:     metoprolol succinate (TOPROL-XL) 25 MG 24 hr tablet, Take 25 mg by mouth every evening. , Disp: , Rfl:     rosuvastatin (CRESTOR) 5 MG tablet, Take 1 tablet (5 mg total) by mouth once daily., Disp: 90 tablet, Rfl: 2    vitamin D 1000 units Tab, Take 2,000 Units by mouth once daily., Disp: , Rfl:     vitamin E 100 UNIT capsule, Take 100 Units by mouth once daily., Disp: , Rfl:     A-C-E-zinc ox-selen AA-copper (VISION FORMULA,X-I-Y-ZN-SE-CU,) 1,000 unit-60 mg-30 unit Tab, Take 1 capsule by mouth., Disp: , Rfl:     hydroCHLOROthiazide (HYDRODIURIL) 12.5 MG Tab, Take 1 tablet (12.5 mg total) by mouth every other day., Disp: 45 tablet, Rfl: 1    LYSINE HCL ORAL, Take by mouth., Disp: , Rfl:     nitroGLYCERIN (NITROSTAT) 0.4 MG SL tablet, Place 1 tablet (0.4 mg total) under the tongue every 5 (five) minutes as needed., Disp: 25 tablet, Rfl: 0    omega-3 acid ethyl esters (LOVAZA) 1 gram capsule, Take 1 capsule by mouth 2 (two) times daily., Disp: , Rfl: 5    potassium chloride (KLOR-CON) 10 MEQ TbSR, Take 1 tablet (10 mEq total) by mouth  "every other day., Disp: 45 tablet, Rfl: 1    Review of Systems   Constitution: Negative for chills, diaphoresis, fever, malaise/fatigue and night sweats. Weight gain: SOME.   HENT: Negative for congestion (ALLERGY) and nosebleeds.    Eyes: Negative for blurred vision and visual disturbance.   Cardiovascular: Positive for leg swelling. Negative for chest pain, claudication, cyanosis, dyspnea on exertion (MILD), irregular heartbeat, near-syncope, orthopnea, palpitations, paroxysmal nocturnal dyspnea and syncope.   Respiratory: Negative for cough, hemoptysis, shortness of breath and wheezing.    Endocrine: Negative for cold intolerance, heat intolerance and polyuria.   Hematologic/Lymphatic: Negative for adenopathy and bleeding problem. Does not bruise/bleed easily.   Skin: Negative for color change, itching and rash.   Musculoskeletal: Negative for arthritis (STABLE), back pain and falls.   Gastrointestinal: Negative for abdominal pain (OCC), change in bowel habit, dysphagia, heartburn (CHRONIC), hematemesis, melena and vomiting.   Genitourinary: Negative for dysuria, flank pain and frequency.   Neurological: Negative for brief paralysis, dizziness, focal weakness, headaches, light-headedness, loss of balance and weakness.   Psychiatric/Behavioral: Negative for altered mental status, depression and memory loss. The patient is not nervous/anxious.    Allergic/Immunologic: Negative for hives and persistent infections.        Objective:      Vitals:    07/08/20 1427   BP: 138/88   Pulse: 74   SpO2: 96%   Weight: 82.7 kg (182 lb 5.1 oz)   Height: 5' 5.5" (1.664 m)   PainSc: 0-No pain     Body mass index is 29.88 kg/m².    Physical Exam   Constitutional: She is oriented to person, place, and time. She appears well-developed and well-nourished. She is active.   HENT:   Head: Normocephalic and atraumatic.   Mouth/Throat: Oropharynx is clear and moist and mucous membranes are normal.   Eyes: Pupils are equal, round, and " reactive to light. Conjunctivae and EOM are normal.   Neck: Trachea normal and normal range of motion. Neck supple. Normal carotid pulses, no hepatojugular reflux and no JVD present. No edema and no erythema present. No thyromegaly present.   Cardiovascular: Normal rate and regular rhythm.  No extrasystoles are present. PMI is not displaced. Exam reveals no gallop and no friction rub.   Murmur heard.   Systolic murmur is present with a grade of 2/6 at the lower left sternal border and apex.  Pulses:       Carotid pulses are 2+ on the right side and 2+ on the left side with bruit.       Radial pulses are 2+ on the right side and 2+ on the left side.        Femoral pulses are 2+ on the right side and 2+ on the left side.       Dorsalis pedis pulses are 2+ on the right side and 2+ on the left side.        Posterior tibial pulses are 2+ on the right side and 2+ on the left side.   Pulmonary/Chest: Effort normal and breath sounds normal. No tachypnea and no bradypnea. She has no wheezes. She has no rales. She exhibits no tenderness.   Abdominal: Soft. Bowel sounds are normal. She exhibits no distension and no mass. There is no hepatosplenomegaly. There is no CVA tenderness.   SCAR   Musculoskeletal: Normal range of motion.         General: No tenderness or edema.   Lymphadenopathy:     She has no cervical adenopathy.   Neurological: She is alert and oriented to person, place, and time. She has normal strength. She displays no tremor. No cranial nerve deficit.   Skin: Skin is warm and dry. No cyanosis or erythema. No pallor.   Psychiatric: She has a normal mood and affect. Her speech is normal and behavior is normal.               ..    Chemistry        Component Value Date/Time     07/02/2020 0849    K 4.2 07/02/2020 0849     07/02/2020 0849    CO2 25 07/02/2020 0849    BUN 12 07/02/2020 0849    CREATININE 0.8 07/02/2020 0849     (H) 07/02/2020 0849        Component Value Date/Time    CALCIUM 8.9  07/02/2020 0849    ALKPHOS 54 (L) 07/02/2020 0849    AST 23 07/02/2020 0849    ALT 28 07/02/2020 0849    BILITOT 0.6 07/02/2020 0849    ESTGFRAFRICA >60.0 07/02/2020 0849    EGFRNONAA >60.0 07/02/2020 0849            ..  Lab Results   Component Value Date    CHOL 127 07/02/2020     Lab Results   Component Value Date    HDL 48 07/02/2020     Lab Results   Component Value Date    LDLCALC 54.6 (L) 07/02/2020     Lab Results   Component Value Date    TRIG 122 07/02/2020     Lab Results   Component Value Date    CHOLHDL 37.8 07/02/2020     ..  Lab Results   Component Value Date    WBC 11.57 06/21/2018    HGB 12.1 06/21/2018    HCT 37.3 06/21/2018    MCV 94 06/21/2018     (H) 06/21/2018       Test(s) Reviewed  I have reviewed the following in detail:  [] Stress test   [] Angiography   [] Echocardiogram   [x] Labs   [] Other:       Assessment:         ICD-10-CM ICD-9-CM   1. Coronary artery disease of native artery of native heart with stable angina pectoris  I25.118 414.01     413.9   2. Essential (primary) hypertension  I10 401.9   3. Bruit of left carotid artery  R09.89 785.9   4. Non-rheumatic mitral regurgitation  I34.0 424.0   5. Hypercholesterolemia  E78.00 272.0     Problem List Items Addressed This Visit        Cardiac/Vascular    Essential (primary) hypertension    Relevant Orders    Comprehensive metabolic panel    Coronary artery disease of native artery of native heart with stable angina pectoris - Primary    Relevant Orders    Comprehensive metabolic panel    Non-rheumatic mitral regurgitation    Relevant Orders    Comprehensive metabolic panel    Hypercholesterolemia    Relevant Orders    Comprehensive metabolic panel    Bruit of left carotid artery    Relevant Orders    CV Ultrasound Bilateral Doppler Carotid           Plan:         ADD HCTZ, KCL, FOR BETTER BLOOD PRESSURE CONTROL, CHECK CAROTID ULTRASOUND, ALL OTHER CV CLINICALLY STABLE, CLASS 0-1 ANGINA, NO HF, NO TIA, NO CLINICAL  ARRHYTHMIA,CONTINUE CURRENT MEDS, EDUCATION, DIET, EXERCISE, RETURN TO CLINIC IN 6 MO WITH LABS  Coronary artery disease of native artery of native heart with stable angina pectoris  -     Comprehensive metabolic panel; Future; Expected date: 01/08/2021    Essential (primary) hypertension  -     Comprehensive metabolic panel; Future; Expected date: 01/08/2021    Bruit of left carotid artery  -     CV Ultrasound Bilateral Doppler Carotid; Future    Non-rheumatic mitral regurgitation  -     Comprehensive metabolic panel; Future; Expected date: 01/08/2021    Hypercholesterolemia  -     Comprehensive metabolic panel; Future; Expected date: 01/08/2021    Other orders  -     hydroCHLOROthiazide (HYDRODIURIL) 12.5 MG Tab; Take 1 tablet (12.5 mg total) by mouth every other day.  Dispense: 45 tablet; Refill: 1  -     potassium chloride (KLOR-CON) 10 MEQ TbSR; Take 1 tablet (10 mEq total) by mouth every other day.  Dispense: 45 tablet; Refill: 1    RTC Low level/low impact aerobic exercise 5x's/wk. Heart healthy diet and risk factor modification.    See labs and med orders.    Aerobic exercise 5x's/wk. Heart healthy diet and risk factor modification.    See labs and med orders.

## 2020-09-03 PROBLEM — C50.911 INFILTRATING DUCTAL CARCINOMA OF BREAST, RIGHT: Status: ACTIVE | Noted: 2020-09-03

## 2020-09-25 ENCOUNTER — TELEPHONE (OUTPATIENT)
Dept: CARDIOLOGY | Facility: CLINIC | Age: 75
End: 2020-09-25

## 2020-09-25 NOTE — TELEPHONE ENCOUNTER
----- Message from Darlin Pond LPN sent at 9/25/2020 11:58 AM CDT -----    ----- Message -----  From: Marcia Singh NP  Sent: 9/24/2020   4:50 PM CDT  To: Darlin Pond LPN    Clearance to PCP or Cardiologist (if she has one). Performing Excision of Axillary Lymph Node.     ----- Message -----  From: Darlin Pond LPN  Sent: 9/24/2020   4:37 PM CDT  To: Marcia Singh NP    What type of surgery are we performing? And clearance to PCP?   ----- Message -----  From: Marcia Singh NP  Sent: 9/24/2020  11:50 AM CDT  To: Sam[facility] Kennedy Staff    We need surgical clearance.

## 2020-10-23 PROBLEM — C77.3 METASTATIC CANCER TO AXILLARY LYMPH NODES: Status: ACTIVE | Noted: 2020-10-23

## 2020-11-16 PROBLEM — S40.021D: Status: ACTIVE | Noted: 2020-11-16

## 2020-11-24 NOTE — PROGRESS NOTES
Late entry.   Venipuncture performed with 21 gauge butterfly, x's 1 attempt,  to L Antecubital vein.  Specimens collected per orders.      Pressure dressing applied to site, instructed patient to remove dressing in 10-15 minutes, OK to re-adjust dressing if pressure causing any discomfort, to observe closely for numbness and/or discoloration to hand or fingers, and to notify provider if bleeding persists after applying constant pressure lasting 30 minutes.

## 2021-01-04 ENCOUNTER — TELEPHONE (OUTPATIENT)
Dept: CARDIOLOGY | Facility: CLINIC | Age: 76
End: 2021-01-04

## 2021-02-02 ENCOUNTER — LAB VISIT (OUTPATIENT)
Dept: FAMILY MEDICINE | Facility: CLINIC | Age: 76
End: 2021-02-02
Attending: INTERNAL MEDICINE
Payer: MEDICARE

## 2021-02-02 DIAGNOSIS — I34.0 NON-RHEUMATIC MITRAL REGURGITATION: ICD-10-CM

## 2021-02-02 DIAGNOSIS — E78.00 HYPERCHOLESTEROLEMIA: ICD-10-CM

## 2021-02-02 DIAGNOSIS — I10 ESSENTIAL (PRIMARY) HYPERTENSION: ICD-10-CM

## 2021-02-02 DIAGNOSIS — I25.118 CORONARY ARTERY DISEASE OF NATIVE ARTERY OF NATIVE HEART WITH STABLE ANGINA PECTORIS: ICD-10-CM

## 2021-02-02 PROCEDURE — 36415 PR COLLECTION VENOUS BLOOD,VENIPUNCTURE: ICD-10-PCS | Mod: S$GLB,,, | Performed by: INTERNAL MEDICINE

## 2021-02-02 PROCEDURE — 80053 COMPREHEN METABOLIC PANEL: CPT

## 2021-02-02 PROCEDURE — 36415 COLL VENOUS BLD VENIPUNCTURE: CPT | Mod: S$GLB,,, | Performed by: INTERNAL MEDICINE

## 2021-02-03 LAB
ALBUMIN SERPL BCP-MCNC: 4 G/DL (ref 3.5–5.2)
ALP SERPL-CCNC: 46 U/L (ref 55–135)
ALT SERPL W/O P-5'-P-CCNC: 20 U/L (ref 10–44)
ANION GAP SERPL CALC-SCNC: 8 MMOL/L (ref 8–16)
AST SERPL-CCNC: 21 U/L (ref 10–40)
BILIRUB SERPL-MCNC: 0.8 MG/DL (ref 0.1–1)
BUN SERPL-MCNC: 15 MG/DL (ref 8–23)
CALCIUM SERPL-MCNC: 9.3 MG/DL (ref 8.7–10.5)
CHLORIDE SERPL-SCNC: 103 MMOL/L (ref 95–110)
CO2 SERPL-SCNC: 30 MMOL/L (ref 23–29)
CREAT SERPL-MCNC: 0.9 MG/DL (ref 0.5–1.4)
EST. GFR  (AFRICAN AMERICAN): >60 ML/MIN/1.73 M^2
EST. GFR  (NON AFRICAN AMERICAN): >60 ML/MIN/1.73 M^2
GLUCOSE SERPL-MCNC: 137 MG/DL (ref 70–110)
POTASSIUM SERPL-SCNC: 4.6 MMOL/L (ref 3.5–5.1)
PROT SERPL-MCNC: 7 G/DL (ref 6–8.4)
SODIUM SERPL-SCNC: 141 MMOL/L (ref 136–145)

## 2021-02-04 ENCOUNTER — CLINICAL SUPPORT (OUTPATIENT)
Dept: CARDIOLOGY | Facility: CLINIC | Age: 76
End: 2021-02-04
Attending: INTERNAL MEDICINE
Payer: MEDICARE

## 2021-02-04 DIAGNOSIS — R09.89 BRUIT OF LEFT CAROTID ARTERY: ICD-10-CM

## 2021-02-04 LAB
LEFT CBA DIAS: 17 CM/S
LEFT CBA SYS: 48 CM/S
LEFT CCA DIST DIAS: 19 CM/S
LEFT CCA DIST SYS: 55 CM/S
LEFT CCA MID DIAS: 17 CM/S
LEFT CCA MID SYS: 60 CM/S
LEFT CCA PROX DIAS: 23 CM/S
LEFT CCA PROX SYS: 85 CM/S
LEFT ECA DIAS: 21 CM/S
LEFT ECA SYS: 67 CM/S
LEFT ICA DIST DIAS: 19 CM/S
LEFT ICA DIST SYS: 56 CM/S
LEFT ICA MID DIAS: 25 CM/S
LEFT ICA MID SYS: 60 CM/S
LEFT ICA PROX DIAS: 17 CM/S
LEFT ICA PROX SYS: 48 CM/S
LEFT VERTEBRAL DIAS: 12 CM/S
LEFT VERTEBRAL SYS: 36 CM/S
OHS CV CAROTID RIGHT ICA EDV HIGHEST: 21
OHS CV CAROTID ULTRASOUND LEFT ICA/CCA RATIO: 1.09
OHS CV CAROTID ULTRASOUND RIGHT ICA/CCA RATIO: 1
OHS CV PV CAROTID LEFT HIGHEST CCA: 85
OHS CV PV CAROTID LEFT HIGHEST ICA: 60
OHS CV PV CAROTID RIGHT HIGHEST CCA: 76
OHS CV PV CAROTID RIGHT HIGHEST ICA: 65
OHS CV US CAROTID LEFT HIGHEST EDV: 25
RIGHT CBA DIAS: 14 CM/S
RIGHT CBA SYS: 56 CM/S
RIGHT CCA DIST DIAS: 17 CM/S
RIGHT CCA DIST SYS: 65 CM/S
RIGHT CCA MID DIAS: 20 CM/S
RIGHT CCA MID SYS: 76 CM/S
RIGHT CCA PROX DIAS: 16 CM/S
RIGHT CCA PROX SYS: 49 CM/S
RIGHT ECA DIAS: 23 CM/S
RIGHT ECA SYS: 84 CM/S
RIGHT ICA DIST DIAS: 21 CM/S
RIGHT ICA DIST SYS: 59 CM/S
RIGHT ICA MID DIAS: 19 CM/S
RIGHT ICA MID SYS: 65 CM/S
RIGHT ICA PROX DIAS: 15 CM/S
RIGHT ICA PROX SYS: 51 CM/S
RIGHT VERTEBRAL DIAS: 10 CM/S
RIGHT VERTEBRAL SYS: 37 CM/S

## 2021-02-04 PROCEDURE — 93880 CV US DOPPLER CAROTID (CUPID ONLY): ICD-10-PCS | Mod: S$GLB,,, | Performed by: INTERNAL MEDICINE

## 2021-02-04 PROCEDURE — 93880 EXTRACRANIAL BILAT STUDY: CPT | Mod: S$GLB,,, | Performed by: INTERNAL MEDICINE

## 2021-02-05 ENCOUNTER — TELEPHONE (OUTPATIENT)
Dept: CARDIOLOGY | Facility: CLINIC | Age: 76
End: 2021-02-05

## 2021-02-09 ENCOUNTER — LAB VISIT (OUTPATIENT)
Dept: FAMILY MEDICINE | Facility: CLINIC | Age: 76
End: 2021-02-09
Payer: MEDICARE

## 2021-02-09 DIAGNOSIS — C34.31 MALIGNANT NEOPLASM OF LOWER LOBE, RIGHT BRONCHUS OR LUNG: ICD-10-CM

## 2021-02-09 DIAGNOSIS — C34.31 MALIGNANT NEOPLASM OF LOWER LOBE, RIGHT BRONCHUS OR LUNG: Primary | ICD-10-CM

## 2021-02-09 DIAGNOSIS — C50.811 MALIGNANT NEOPLASM OF OVERLAPPING SITES OF RIGHT FEMALE BREAST, UNSPECIFIED ESTROGEN RECEPTOR STATUS: ICD-10-CM

## 2021-02-09 LAB
ALBUMIN SERPL BCP-MCNC: 4 G/DL (ref 3.5–5.2)
ALP SERPL-CCNC: 49 U/L (ref 55–135)
ALT SERPL W/O P-5'-P-CCNC: 20 U/L (ref 10–44)
ANION GAP SERPL CALC-SCNC: 9 MMOL/L (ref 8–16)
AST SERPL-CCNC: 20 U/L (ref 10–40)
BASOPHILS # BLD AUTO: 0.04 K/UL (ref 0–0.2)
BASOPHILS NFR BLD: 0.6 % (ref 0–1.9)
BILIRUB SERPL-MCNC: 0.5 MG/DL (ref 0.1–1)
BUN SERPL-MCNC: 12 MG/DL (ref 8–23)
CALCIUM SERPL-MCNC: 8.6 MG/DL (ref 8.7–10.5)
CHLORIDE SERPL-SCNC: 106 MMOL/L (ref 95–110)
CO2 SERPL-SCNC: 26 MMOL/L (ref 23–29)
CREAT SERPL-MCNC: 0.8 MG/DL (ref 0.5–1.4)
DIFFERENTIAL METHOD: ABNORMAL
EOSINOPHIL # BLD AUTO: 0.3 K/UL (ref 0–0.5)
EOSINOPHIL NFR BLD: 3.8 % (ref 0–8)
ERYTHROCYTE [DISTWIDTH] IN BLOOD BY AUTOMATED COUNT: 13.3 % (ref 11.5–14.5)
EST. GFR  (AFRICAN AMERICAN): >60 ML/MIN/1.73 M^2
EST. GFR  (NON AFRICAN AMERICAN): >60 ML/MIN/1.73 M^2
GLUCOSE SERPL-MCNC: 127 MG/DL (ref 70–110)
HCT VFR BLD AUTO: 47.5 % (ref 37–48.5)
HGB BLD-MCNC: 15.3 G/DL (ref 12–16)
IMM GRANULOCYTES # BLD AUTO: 0.05 K/UL (ref 0–0.04)
IMM GRANULOCYTES NFR BLD AUTO: 0.7 % (ref 0–0.5)
LYMPHOCYTES # BLD AUTO: 2.1 K/UL (ref 1–4.8)
LYMPHOCYTES NFR BLD: 29.8 % (ref 18–48)
MCH RBC QN AUTO: 30.7 PG (ref 27–31)
MCHC RBC AUTO-ENTMCNC: 32.2 G/DL (ref 32–36)
MCV RBC AUTO: 95 FL (ref 82–98)
MONOCYTES # BLD AUTO: 1 K/UL (ref 0.3–1)
MONOCYTES NFR BLD: 13.4 % (ref 4–15)
NEUTROPHILS # BLD AUTO: 3.7 K/UL (ref 1.8–7.7)
NEUTROPHILS NFR BLD: 51.7 % (ref 38–73)
NRBC BLD-RTO: 0 /100 WBC
PLATELET # BLD AUTO: 228 K/UL (ref 150–350)
PMV BLD AUTO: 11.1 FL (ref 9.2–12.9)
POTASSIUM SERPL-SCNC: 4.1 MMOL/L (ref 3.5–5.1)
PROT SERPL-MCNC: 7.3 G/DL (ref 6–8.4)
RBC # BLD AUTO: 4.99 M/UL (ref 4–5.4)
SODIUM SERPL-SCNC: 141 MMOL/L (ref 136–145)
WBC # BLD AUTO: 7.15 K/UL (ref 3.9–12.7)

## 2021-02-09 PROCEDURE — 85025 COMPLETE CBC W/AUTO DIFF WBC: CPT

## 2021-02-09 PROCEDURE — 80053 COMPREHEN METABOLIC PANEL: CPT

## 2021-02-12 LAB — CANCER AG27-29 SERPL-ACNC: 15.1 U/ML

## 2021-03-10 ENCOUNTER — TELEPHONE (OUTPATIENT)
Dept: CARDIOLOGY | Facility: CLINIC | Age: 76
End: 2021-03-10

## 2021-03-17 ENCOUNTER — OFFICE VISIT (OUTPATIENT)
Dept: CARDIOLOGY | Facility: CLINIC | Age: 76
End: 2021-03-17
Payer: MEDICARE

## 2021-03-17 VITALS
OXYGEN SATURATION: 94 % | WEIGHT: 178.81 LBS | HEIGHT: 65 IN | BODY MASS INDEX: 29.79 KG/M2 | DIASTOLIC BLOOD PRESSURE: 74 MMHG | HEART RATE: 75 BPM | SYSTOLIC BLOOD PRESSURE: 108 MMHG

## 2021-03-17 DIAGNOSIS — I83.893 VARICOSE VEINS OF BOTH LEGS WITH EDEMA: ICD-10-CM

## 2021-03-17 DIAGNOSIS — I25.118 CORONARY ARTERY DISEASE OF NATIVE ARTERY OF NATIVE HEART WITH STABLE ANGINA PECTORIS: ICD-10-CM

## 2021-03-17 DIAGNOSIS — I77.9 MILD CAROTID ARTERY DISEASE: ICD-10-CM

## 2021-03-17 DIAGNOSIS — I10 ESSENTIAL (PRIMARY) HYPERTENSION: ICD-10-CM

## 2021-03-17 DIAGNOSIS — E66.3 OVERWEIGHT (BMI 25.0-29.9): ICD-10-CM

## 2021-03-17 DIAGNOSIS — R60.0 BILATERAL LEG EDEMA: Primary | ICD-10-CM

## 2021-03-17 DIAGNOSIS — E78.00 HYPERCHOLESTEROLEMIA: ICD-10-CM

## 2021-03-17 PROCEDURE — 99214 PR OFFICE/OUTPT VISIT, EST, LEVL IV, 30-39 MIN: ICD-10-PCS | Mod: S$GLB,,, | Performed by: INTERNAL MEDICINE

## 2021-03-17 PROCEDURE — 99214 OFFICE O/P EST MOD 30 MIN: CPT | Mod: S$GLB,,, | Performed by: INTERNAL MEDICINE

## 2021-03-17 RX ORDER — MONTELUKAST SODIUM 10 MG/1
10 TABLET ORAL NIGHTLY
COMMUNITY

## 2021-03-17 RX ORDER — AMLODIPINE BESYLATE 10 MG/1
10 TABLET ORAL DAILY
COMMUNITY
End: 2021-03-17 | Stop reason: DRUGHIGH

## 2021-03-17 RX ORDER — FLUCONAZOLE 150 MG/1
TABLET ORAL
COMMUNITY
Start: 2021-02-10

## 2021-03-17 RX ORDER — AMLODIPINE BESYLATE 5 MG/1
5 TABLET ORAL DAILY
Qty: 90 TABLET | Refills: 1 | Status: SHIPPED | OUTPATIENT
Start: 2021-03-17 | End: 2021-11-17 | Stop reason: SINTOL

## 2021-03-23 ENCOUNTER — CLINICAL SUPPORT (OUTPATIENT)
Dept: CARDIOLOGY | Facility: CLINIC | Age: 76
End: 2021-03-23
Attending: INTERNAL MEDICINE
Payer: MEDICARE

## 2021-03-23 DIAGNOSIS — R60.0 BILATERAL LEG EDEMA: ICD-10-CM

## 2021-03-23 DIAGNOSIS — I83.893 VARICOSE VEINS OF BOTH LEGS WITH EDEMA: ICD-10-CM

## 2021-03-23 PROCEDURE — 93970 EXTREMITY STUDY: CPT | Mod: PBBFAC,PO | Performed by: INTERNAL MEDICINE

## 2021-03-23 PROCEDURE — 93970 CV US LOWER VENOUS INSUFFICIENCY BILATERAL (CUPID ONLY): ICD-10-PCS | Mod: 26,S$PBB,, | Performed by: INTERNAL MEDICINE

## 2021-03-24 LAB
LEFT GREAT SAPHENOUS JUNCTION DIA: 7.9 CM
LEFT GREAT SAPHENOUS JUNCTION REFLUX: 4784 MS
LEFT GREAT SAPHENOUS KNEE DIA: 2.4 CM
LEFT GREAT SAPHENOUS MIDDLE THIGH DIA: 3 CM
RIGHT GREAT SAPHENOUS JUNCTION DIA: 7.8 CM

## 2021-05-04 ENCOUNTER — LAB VISIT (OUTPATIENT)
Dept: FAMILY MEDICINE | Facility: CLINIC | Age: 76
End: 2021-05-04
Payer: MEDICARE

## 2021-05-04 ENCOUNTER — CLINICAL SUPPORT (OUTPATIENT)
Dept: FAMILY MEDICINE | Facility: CLINIC | Age: 76
End: 2021-05-04
Payer: MEDICARE

## 2021-05-04 DIAGNOSIS — E11.9 DIABETES MELLITUS TYPE 2 IN NONOBESE: ICD-10-CM

## 2021-05-04 DIAGNOSIS — I10 ESSENTIAL HYPERTENSION, BENIGN: ICD-10-CM

## 2021-05-04 DIAGNOSIS — D50.9 IRON DEFICIENCY ANEMIA, UNSPECIFIED IRON DEFICIENCY ANEMIA TYPE: ICD-10-CM

## 2021-05-04 DIAGNOSIS — E53.8 VITAMIN B 12 DEFICIENCY: ICD-10-CM

## 2021-05-04 DIAGNOSIS — C34.31 MALIGNANT NEOPLASM OF LOWER LOBE, RIGHT BRONCHUS OR LUNG: ICD-10-CM

## 2021-05-04 DIAGNOSIS — C50.811 MALIGNANT NEOPLASM OF OVERLAPPING SITES OF RIGHT FEMALE BREAST, UNSPECIFIED ESTROGEN RECEPTOR STATUS: ICD-10-CM

## 2021-05-04 DIAGNOSIS — N18.2 CHRONIC KIDNEY DISEASE, STAGE II (MILD): ICD-10-CM

## 2021-05-04 DIAGNOSIS — E78.00 HYPERCHOLESTEROLEMIA: ICD-10-CM

## 2021-05-04 PROCEDURE — 83735 ASSAY OF MAGNESIUM: CPT | Performed by: NURSE PRACTITIONER

## 2021-05-04 PROCEDURE — 85025 COMPLETE CBC W/AUTO DIFF WBC: CPT | Performed by: INTERNAL MEDICINE

## 2021-05-04 PROCEDURE — 80053 COMPREHEN METABOLIC PANEL: CPT | Performed by: INTERNAL MEDICINE

## 2021-05-04 PROCEDURE — 82607 VITAMIN B-12: CPT | Performed by: NURSE PRACTITIONER

## 2021-05-04 PROCEDURE — 80061 LIPID PANEL: CPT | Performed by: NURSE PRACTITIONER

## 2021-05-04 PROCEDURE — 83036 HEMOGLOBIN GLYCOSYLATED A1C: CPT | Performed by: NURSE PRACTITIONER

## 2021-05-04 PROCEDURE — 82043 UR ALBUMIN QUANTITATIVE: CPT | Performed by: NURSE PRACTITIONER

## 2021-05-04 PROCEDURE — 36415 COLL VENOUS BLD VENIPUNCTURE: CPT | Performed by: INTERNAL MEDICINE

## 2021-05-04 PROCEDURE — 83540 ASSAY OF IRON: CPT | Performed by: NURSE PRACTITIONER

## 2021-05-04 PROCEDURE — 82570 ASSAY OF URINE CREATININE: CPT | Performed by: NURSE PRACTITIONER

## 2021-05-05 LAB
ALBUMIN SERPL BCP-MCNC: 4 G/DL (ref 3.5–5.2)
ALP SERPL-CCNC: 44 U/L (ref 55–135)
ALT SERPL W/O P-5'-P-CCNC: 18 U/L (ref 10–44)
ANION GAP SERPL CALC-SCNC: 9 MMOL/L (ref 8–16)
AST SERPL-CCNC: 22 U/L (ref 10–40)
BASOPHILS # BLD AUTO: 0.04 K/UL (ref 0–0.2)
BASOPHILS NFR BLD: 0.4 % (ref 0–1.9)
BILIRUB SERPL-MCNC: 0.9 MG/DL (ref 0.1–1)
BUN SERPL-MCNC: 14 MG/DL (ref 8–23)
CALCIUM SERPL-MCNC: 9.8 MG/DL (ref 8.7–10.5)
CHLORIDE SERPL-SCNC: 105 MMOL/L (ref 95–110)
CHOLEST SERPL-MCNC: 121 MG/DL (ref 120–199)
CHOLEST/HDLC SERPL: 3 {RATIO} (ref 2–5)
CO2 SERPL-SCNC: 25 MMOL/L (ref 23–29)
CREAT SERPL-MCNC: 0.9 MG/DL (ref 0.5–1.4)
DIFFERENTIAL METHOD: ABNORMAL
EOSINOPHIL # BLD AUTO: 0.2 K/UL (ref 0–0.5)
EOSINOPHIL NFR BLD: 2 % (ref 0–8)
ERYTHROCYTE [DISTWIDTH] IN BLOOD BY AUTOMATED COUNT: 13.1 % (ref 11.5–14.5)
EST. GFR  (AFRICAN AMERICAN): >60 ML/MIN/1.73 M^2
EST. GFR  (NON AFRICAN AMERICAN): >60 ML/MIN/1.73 M^2
ESTIMATED AVG GLUCOSE: 146 MG/DL (ref 68–131)
GLUCOSE SERPL-MCNC: 110 MG/DL (ref 70–110)
HBA1C MFR BLD: 6.7 % (ref 4–5.6)
HCT VFR BLD AUTO: 46 % (ref 37–48.5)
HDLC SERPL-MCNC: 40 MG/DL (ref 40–75)
HDLC SERPL: 33.1 % (ref 20–50)
HGB BLD-MCNC: 15.4 G/DL (ref 12–16)
IMM GRANULOCYTES # BLD AUTO: 0.05 K/UL (ref 0–0.04)
IMM GRANULOCYTES NFR BLD AUTO: 0.5 % (ref 0–0.5)
IRON SERPL-MCNC: 94 UG/DL (ref 30–160)
LDLC SERPL CALC-MCNC: 52.8 MG/DL (ref 63–159)
LYMPHOCYTES # BLD AUTO: 3.7 K/UL (ref 1–4.8)
LYMPHOCYTES NFR BLD: 40.5 % (ref 18–48)
MAGNESIUM SERPL-MCNC: 2 MG/DL (ref 1.6–2.6)
MCH RBC QN AUTO: 31.4 PG (ref 27–31)
MCHC RBC AUTO-ENTMCNC: 33.5 G/DL (ref 32–36)
MCV RBC AUTO: 94 FL (ref 82–98)
MONOCYTES # BLD AUTO: 0.8 K/UL (ref 0.3–1)
MONOCYTES NFR BLD: 8.7 % (ref 4–15)
NEUTROPHILS # BLD AUTO: 4.4 K/UL (ref 1.8–7.7)
NEUTROPHILS NFR BLD: 47.9 % (ref 38–73)
NONHDLC SERPL-MCNC: 81 MG/DL
NRBC BLD-RTO: 0 /100 WBC
PLATELET # BLD AUTO: 239 K/UL (ref 150–450)
PMV BLD AUTO: 11.5 FL (ref 9.2–12.9)
POTASSIUM SERPL-SCNC: 4.3 MMOL/L (ref 3.5–5.1)
PROT SERPL-MCNC: 7.4 G/DL (ref 6–8.4)
RBC # BLD AUTO: 4.91 M/UL (ref 4–5.4)
SATURATED IRON: 22 % (ref 20–50)
SODIUM SERPL-SCNC: 139 MMOL/L (ref 136–145)
TOTAL IRON BINDING CAPACITY: 423 UG/DL (ref 250–450)
TRANSFERRIN SERPL-MCNC: 286 MG/DL (ref 200–375)
TRIGL SERPL-MCNC: 141 MG/DL (ref 30–150)
VIT B12 SERPL-MCNC: 953 PG/ML (ref 210–950)
WBC # BLD AUTO: 9.13 K/UL (ref 3.9–12.7)

## 2021-05-07 LAB — CANCER AG27-29 SERPL-ACNC: 13.1 U/ML

## 2021-08-20 ENCOUNTER — LAB VISIT (OUTPATIENT)
Dept: FAMILY MEDICINE | Facility: CLINIC | Age: 76
End: 2021-08-20
Payer: MEDICARE

## 2021-08-20 DIAGNOSIS — E78.5 HYPERLIPIDEMIA, UNSPECIFIED HYPERLIPIDEMIA TYPE: Primary | ICD-10-CM

## 2021-08-20 DIAGNOSIS — Z79.899 ENCOUNTER FOR LONG-TERM (CURRENT) USE OF MEDICATIONS: ICD-10-CM

## 2021-08-20 DIAGNOSIS — E10.65 TYPE 1 DIABETES MELLITUS WITH HYPERGLYCEMIA: ICD-10-CM

## 2021-08-20 DIAGNOSIS — Z13.29 THYROID DISORDER SCREENING: ICD-10-CM

## 2021-08-20 DIAGNOSIS — Z13.21 ENCOUNTER FOR VITAMIN DEFICIENCY SCREENING: ICD-10-CM

## 2021-08-20 DIAGNOSIS — E13.65 OTHER SPECIFIED DIABETES MELLITUS WITH HYPERGLYCEMIA: ICD-10-CM

## 2021-08-20 DIAGNOSIS — E11.9 DIABETES MELLITUS WITHOUT COMPLICATION: ICD-10-CM

## 2021-08-20 DIAGNOSIS — E13.65 OTHER SPECIFIED DIABETES MELLITUS WITH HYPERGLYCEMIA, UNSPECIFIED WHETHER LONG TERM INSULIN USE: ICD-10-CM

## 2021-08-20 DIAGNOSIS — R89.9 ABNORMAL LABORATORY TEST: ICD-10-CM

## 2021-08-20 LAB
BASOPHILS # BLD AUTO: 0.04 K/UL (ref 0–0.2)
BASOPHILS NFR BLD: 0.5 % (ref 0–1.9)
DIFFERENTIAL METHOD: NORMAL
EOSINOPHIL # BLD AUTO: 0.2 K/UL (ref 0–0.5)
EOSINOPHIL NFR BLD: 2.5 % (ref 0–8)
ERYTHROCYTE [DISTWIDTH] IN BLOOD BY AUTOMATED COUNT: 12.9 % (ref 11.5–14.5)
HCT VFR BLD AUTO: 47.3 % (ref 37–48.5)
HGB BLD-MCNC: 15.4 G/DL (ref 12–16)
IMM GRANULOCYTES # BLD AUTO: 0.02 K/UL (ref 0–0.04)
IMM GRANULOCYTES NFR BLD AUTO: 0.2 % (ref 0–0.5)
LYMPHOCYTES # BLD AUTO: 2.8 K/UL (ref 1–4.8)
LYMPHOCYTES NFR BLD: 32.1 % (ref 18–48)
MCH RBC QN AUTO: 30.9 PG (ref 27–31)
MCHC RBC AUTO-ENTMCNC: 32.6 G/DL (ref 32–36)
MCV RBC AUTO: 95 FL (ref 82–98)
MONOCYTES # BLD AUTO: 1 K/UL (ref 0.3–1)
MONOCYTES NFR BLD: 11.3 % (ref 4–15)
NEUTROPHILS # BLD AUTO: 4.6 K/UL (ref 1.8–7.7)
NEUTROPHILS NFR BLD: 53.4 % (ref 38–73)
NRBC BLD-RTO: 0 /100 WBC
PLATELET # BLD AUTO: 252 K/UL (ref 150–450)
PMV BLD AUTO: 10.9 FL (ref 9.2–12.9)
RBC # BLD AUTO: 4.99 M/UL (ref 4–5.4)
WBC # BLD AUTO: 8.66 K/UL (ref 3.9–12.7)

## 2021-08-20 PROCEDURE — 82306 VITAMIN D 25 HYDROXY: CPT | Performed by: NURSE PRACTITIONER

## 2021-08-20 PROCEDURE — 84439 ASSAY OF FREE THYROXINE: CPT | Performed by: NURSE PRACTITIONER

## 2021-08-20 PROCEDURE — 83735 ASSAY OF MAGNESIUM: CPT | Performed by: NURSE PRACTITIONER

## 2021-08-20 PROCEDURE — 85025 COMPLETE CBC W/AUTO DIFF WBC: CPT | Performed by: NURSE PRACTITIONER

## 2021-08-20 PROCEDURE — 36415 COLL VENOUS BLD VENIPUNCTURE: CPT | Performed by: NURSE PRACTITIONER

## 2021-08-20 PROCEDURE — 80053 COMPREHEN METABOLIC PANEL: CPT | Performed by: NURSE PRACTITIONER

## 2021-08-20 PROCEDURE — 84443 ASSAY THYROID STIM HORMONE: CPT | Performed by: NURSE PRACTITIONER

## 2021-08-21 ENCOUNTER — TELEPHONE (OUTPATIENT)
Dept: FAMILY MEDICINE | Facility: CLINIC | Age: 76
End: 2021-08-21

## 2021-08-21 LAB
25(OH)D3+25(OH)D2 SERPL-MCNC: 66 NG/ML (ref 30–96)
ALBUMIN SERPL BCP-MCNC: 3.9 G/DL (ref 3.5–5.2)
ALP SERPL-CCNC: 44 U/L (ref 55–135)
ALT SERPL W/O P-5'-P-CCNC: 19 U/L (ref 10–44)
ANION GAP SERPL CALC-SCNC: 10 MMOL/L (ref 8–16)
AST SERPL-CCNC: 18 U/L (ref 10–40)
BILIRUB SERPL-MCNC: 0.6 MG/DL (ref 0.1–1)
BUN SERPL-MCNC: 11 MG/DL (ref 8–23)
CALCIUM SERPL-MCNC: 9.2 MG/DL (ref 8.7–10.5)
CHLORIDE SERPL-SCNC: 109 MMOL/L (ref 95–110)
CO2 SERPL-SCNC: 25 MMOL/L (ref 23–29)
CREAT SERPL-MCNC: 0.8 MG/DL (ref 0.5–1.4)
EST. GFR  (AFRICAN AMERICAN): >60 ML/MIN/1.73 M^2
EST. GFR  (NON AFRICAN AMERICAN): >60 ML/MIN/1.73 M^2
GLUCOSE SERPL-MCNC: 122 MG/DL (ref 70–110)
MAGNESIUM SERPL-MCNC: 2.1 MG/DL (ref 1.6–2.6)
POTASSIUM SERPL-SCNC: 4.6 MMOL/L (ref 3.5–5.1)
PROT SERPL-MCNC: 6.9 G/DL (ref 6–8.4)
SODIUM SERPL-SCNC: 144 MMOL/L (ref 136–145)
T4 FREE SERPL-MCNC: 0.79 NG/DL (ref 0.71–1.51)
TSH SERPL DL<=0.005 MIU/L-ACNC: 3.85 UIU/ML (ref 0.4–4)

## 2021-09-13 ENCOUNTER — TELEPHONE (OUTPATIENT)
Dept: CARDIOLOGY | Facility: CLINIC | Age: 76
End: 2021-09-13

## 2021-10-20 ENCOUNTER — LAB VISIT (OUTPATIENT)
Dept: FAMILY MEDICINE | Facility: CLINIC | Age: 76
End: 2021-10-20
Payer: MEDICARE

## 2021-10-20 ENCOUNTER — TELEPHONE (OUTPATIENT)
Dept: FAMILY MEDICINE | Facility: CLINIC | Age: 76
End: 2021-10-20
Payer: MEDICARE

## 2021-10-20 DIAGNOSIS — M81.0 OSTEOPOROSIS, POSTMENOPAUSAL: ICD-10-CM

## 2021-10-20 DIAGNOSIS — E11.9 DIABETES MELLITUS WITHOUT COMPLICATION: ICD-10-CM

## 2021-10-20 DIAGNOSIS — I25.118 CORONARY ARTERY DISEASE OF NATIVE ARTERY OF NATIVE HEART WITH STABLE ANGINA PECTORIS: ICD-10-CM

## 2021-10-20 DIAGNOSIS — E78.00 HYPERCHOLESTEROLEMIA: ICD-10-CM

## 2021-10-20 DIAGNOSIS — E78.5 HYPERLIPIDEMIA, UNSPECIFIED HYPERLIPIDEMIA TYPE: Primary | ICD-10-CM

## 2021-10-20 DIAGNOSIS — Z79.899 ENCOUNTER FOR LONG-TERM (CURRENT) USE OF OTHER MEDICATIONS: ICD-10-CM

## 2021-10-20 DIAGNOSIS — I10 HYPERTENSION, UNSPECIFIED TYPE: ICD-10-CM

## 2021-10-20 DIAGNOSIS — R89.9 ABNORMAL LABORATORY TEST RESULT: ICD-10-CM

## 2021-10-20 DIAGNOSIS — E78.00 HYPERCHOLESTEROLEMIA: Primary | ICD-10-CM

## 2021-10-20 DIAGNOSIS — Z13.21 ENCOUNTER FOR VITAMIN DEFICIENCY SCREENING: ICD-10-CM

## 2021-10-20 DIAGNOSIS — Z13.29 SCREENING FOR THYROID DISORDER: ICD-10-CM

## 2021-10-20 DIAGNOSIS — E66.3 OVERWEIGHT (BMI 25.0-29.9): ICD-10-CM

## 2021-10-20 LAB
25(OH)D3+25(OH)D2 SERPL-MCNC: 72 NG/ML (ref 30–96)
BASOPHILS # BLD AUTO: 0.03 K/UL (ref 0–0.2)
BASOPHILS NFR BLD: 0.3 % (ref 0–1.9)
DIFFERENTIAL METHOD: NORMAL
EOSINOPHIL # BLD AUTO: 0.2 K/UL (ref 0–0.5)
EOSINOPHIL NFR BLD: 1.6 % (ref 0–8)
ERYTHROCYTE [DISTWIDTH] IN BLOOD BY AUTOMATED COUNT: 12.8 % (ref 11.5–14.5)
ESTIMATED AVG GLUCOSE: 140 MG/DL (ref 68–131)
HBA1C MFR BLD: 6.5 % (ref 4–5.6)
HCT VFR BLD AUTO: 45.7 % (ref 37–48.5)
HGB BLD-MCNC: 15.2 G/DL (ref 12–16)
IMM GRANULOCYTES # BLD AUTO: 0.01 K/UL (ref 0–0.04)
IMM GRANULOCYTES NFR BLD AUTO: 0.1 % (ref 0–0.5)
LYMPHOCYTES # BLD AUTO: 2.4 K/UL (ref 1–4.8)
LYMPHOCYTES NFR BLD: 26.1 % (ref 18–48)
MCH RBC QN AUTO: 31 PG (ref 27–31)
MCHC RBC AUTO-ENTMCNC: 33.3 G/DL (ref 32–36)
MCV RBC AUTO: 93 FL (ref 82–98)
MONOCYTES # BLD AUTO: 1 K/UL (ref 0.3–1)
MONOCYTES NFR BLD: 11.2 % (ref 4–15)
NEUTROPHILS # BLD AUTO: 5.6 K/UL (ref 1.8–7.7)
NEUTROPHILS NFR BLD: 60.7 % (ref 38–73)
NRBC BLD-RTO: 0 /100 WBC
PLATELET # BLD AUTO: 236 K/UL (ref 150–450)
PMV BLD AUTO: 10.5 FL (ref 9.2–12.9)
RBC # BLD AUTO: 4.91 M/UL (ref 4–5.4)
T4 FREE SERPL-MCNC: 0.73 NG/DL (ref 0.71–1.51)
TSH SERPL DL<=0.005 MIU/L-ACNC: 2.82 UIU/ML (ref 0.4–4)
WBC # BLD AUTO: 9.22 K/UL (ref 3.9–12.7)

## 2021-10-20 PROCEDURE — 82306 VITAMIN D 25 HYDROXY: CPT | Performed by: NURSE PRACTITIONER

## 2021-10-20 PROCEDURE — 80053 COMPREHEN METABOLIC PANEL: CPT | Performed by: NURSE PRACTITIONER

## 2021-10-20 PROCEDURE — 85025 COMPLETE CBC W/AUTO DIFF WBC: CPT | Performed by: NURSE PRACTITIONER

## 2021-10-20 PROCEDURE — 84439 ASSAY OF FREE THYROXINE: CPT | Performed by: NURSE PRACTITIONER

## 2021-10-20 PROCEDURE — 83036 HEMOGLOBIN GLYCOSYLATED A1C: CPT | Performed by: NURSE PRACTITIONER

## 2021-10-20 PROCEDURE — 36415 COLL VENOUS BLD VENIPUNCTURE: CPT | Performed by: NURSE PRACTITIONER

## 2021-10-20 PROCEDURE — 84443 ASSAY THYROID STIM HORMONE: CPT | Performed by: NURSE PRACTITIONER

## 2021-10-20 PROCEDURE — 83735 ASSAY OF MAGNESIUM: CPT | Performed by: NURSE PRACTITIONER

## 2021-10-21 ENCOUNTER — TELEPHONE (OUTPATIENT)
Dept: CARDIOLOGY | Facility: CLINIC | Age: 76
End: 2021-10-21

## 2021-10-21 LAB
ALBUMIN SERPL BCP-MCNC: 3.9 G/DL (ref 3.5–5.2)
ALP SERPL-CCNC: 43 U/L (ref 55–135)
ALT SERPL W/O P-5'-P-CCNC: 13 U/L (ref 10–44)
ANION GAP SERPL CALC-SCNC: 9 MMOL/L (ref 8–16)
AST SERPL-CCNC: 18 U/L (ref 10–40)
BILIRUB SERPL-MCNC: 0.7 MG/DL (ref 0.1–1)
BUN SERPL-MCNC: 13 MG/DL (ref 8–23)
CALCIUM SERPL-MCNC: 8.9 MG/DL (ref 8.7–10.5)
CHLORIDE SERPL-SCNC: 106 MMOL/L (ref 95–110)
CO2 SERPL-SCNC: 24 MMOL/L (ref 23–29)
CREAT SERPL-MCNC: 0.8 MG/DL (ref 0.5–1.4)
EST. GFR  (AFRICAN AMERICAN): >60 ML/MIN/1.73 M^2
EST. GFR  (NON AFRICAN AMERICAN): >60 ML/MIN/1.73 M^2
GLUCOSE SERPL-MCNC: 122 MG/DL (ref 70–110)
MAGNESIUM SERPL-MCNC: 2.1 MG/DL (ref 1.6–2.6)
POTASSIUM SERPL-SCNC: 4.6 MMOL/L (ref 3.5–5.1)
PROT SERPL-MCNC: 6.9 G/DL (ref 6–8.4)
SODIUM SERPL-SCNC: 139 MMOL/L (ref 136–145)

## 2021-11-17 ENCOUNTER — OFFICE VISIT (OUTPATIENT)
Dept: CARDIOLOGY | Facility: CLINIC | Age: 76
End: 2021-11-17
Payer: MEDICARE

## 2021-11-17 VITALS
BODY MASS INDEX: 29.72 KG/M2 | DIASTOLIC BLOOD PRESSURE: 94 MMHG | HEART RATE: 75 BPM | WEIGHT: 178.38 LBS | SYSTOLIC BLOOD PRESSURE: 185 MMHG | HEIGHT: 65 IN

## 2021-11-17 DIAGNOSIS — I34.0 NON-RHEUMATIC MITRAL REGURGITATION: Chronic | ICD-10-CM

## 2021-11-17 DIAGNOSIS — E78.00 HYPERCHOLESTEROLEMIA: Chronic | ICD-10-CM

## 2021-11-17 DIAGNOSIS — I77.9 MILD CAROTID ARTERY DISEASE: Chronic | ICD-10-CM

## 2021-11-17 DIAGNOSIS — E66.3 OVERWEIGHT (BMI 25.0-29.9): Chronic | ICD-10-CM

## 2021-11-17 DIAGNOSIS — I10 ESSENTIAL (PRIMARY) HYPERTENSION: Chronic | ICD-10-CM

## 2021-11-17 DIAGNOSIS — T50.905A DRUG SIDE EFFECTS, INITIAL ENCOUNTER: Primary | ICD-10-CM

## 2021-11-17 DIAGNOSIS — I25.118 CORONARY ARTERY DISEASE OF NATIVE ARTERY OF NATIVE HEART WITH STABLE ANGINA PECTORIS: Chronic | ICD-10-CM

## 2021-11-17 PROCEDURE — 99214 PR OFFICE/OUTPT VISIT, EST, LEVL IV, 30-39 MIN: ICD-10-PCS | Mod: S$GLB,,, | Performed by: INTERNAL MEDICINE

## 2021-11-17 PROCEDURE — 99214 OFFICE O/P EST MOD 30 MIN: CPT | Mod: S$GLB,,, | Performed by: INTERNAL MEDICINE

## 2021-11-17 RX ORDER — LOSARTAN POTASSIUM 100 MG/1
100 TABLET ORAL DAILY
COMMUNITY
End: 2023-10-11 | Stop reason: ALTCHOICE

## 2021-11-17 RX ORDER — CLINDAMYCIN HYDROCHLORIDE 300 MG/1
CAPSULE ORAL
COMMUNITY
Start: 2021-10-29

## 2021-11-17 RX ORDER — HYDRALAZINE HYDROCHLORIDE 50 MG/1
50 TABLET, FILM COATED ORAL 3 TIMES DAILY
Qty: 90 TABLET | Refills: 5 | Status: SHIPPED | OUTPATIENT
Start: 2021-11-17 | End: 2022-05-18 | Stop reason: DRUGHIGH

## 2021-11-18 ENCOUNTER — LAB VISIT (OUTPATIENT)
Dept: FAMILY MEDICINE | Facility: CLINIC | Age: 76
End: 2021-11-18
Payer: MEDICARE

## 2021-11-18 DIAGNOSIS — I25.118 CORONARY ARTERY DISEASE OF NATIVE ARTERY OF NATIVE HEART WITH STABLE ANGINA PECTORIS: ICD-10-CM

## 2021-11-18 DIAGNOSIS — E78.00 HYPERCHOLESTEROLEMIA: ICD-10-CM

## 2021-11-18 PROCEDURE — 36415 COLL VENOUS BLD VENIPUNCTURE: CPT | Mod: S$GLB,,, | Performed by: INTERNAL MEDICINE

## 2021-11-18 PROCEDURE — 36415 PR COLLECTION VENOUS BLOOD,VENIPUNCTURE: ICD-10-PCS | Mod: S$GLB,,, | Performed by: INTERNAL MEDICINE

## 2021-11-18 PROCEDURE — 80061 LIPID PANEL: CPT | Performed by: INTERNAL MEDICINE

## 2021-11-19 LAB
CHOLEST SERPL-MCNC: 111 MG/DL (ref 120–199)
CHOLEST/HDLC SERPL: 2.5 {RATIO} (ref 2–5)
HDLC SERPL-MCNC: 45 MG/DL (ref 40–75)
HDLC SERPL: 40.5 % (ref 20–50)
LDLC SERPL CALC-MCNC: 51.4 MG/DL (ref 63–159)
NONHDLC SERPL-MCNC: 66 MG/DL
TRIGL SERPL-MCNC: 73 MG/DL (ref 30–150)

## 2022-05-18 ENCOUNTER — OFFICE VISIT (OUTPATIENT)
Dept: CARDIOLOGY | Facility: CLINIC | Age: 77
End: 2022-05-18
Payer: MEDICARE

## 2022-05-18 VITALS
HEIGHT: 65 IN | WEIGHT: 185.63 LBS | DIASTOLIC BLOOD PRESSURE: 82 MMHG | SYSTOLIC BLOOD PRESSURE: 138 MMHG | BODY MASS INDEX: 30.93 KG/M2 | HEART RATE: 76 BPM

## 2022-05-18 DIAGNOSIS — I10 ESSENTIAL (PRIMARY) HYPERTENSION: Chronic | ICD-10-CM

## 2022-05-18 DIAGNOSIS — E66.9 OBESITY, CLASS I, BMI 30-34.9: Chronic | ICD-10-CM

## 2022-05-18 DIAGNOSIS — I25.118 CORONARY ARTERY DISEASE OF NATIVE ARTERY OF NATIVE HEART WITH STABLE ANGINA PECTORIS: Primary | Chronic | ICD-10-CM

## 2022-05-18 DIAGNOSIS — I34.0 NON-RHEUMATIC MITRAL REGURGITATION: Chronic | ICD-10-CM

## 2022-05-18 DIAGNOSIS — E78.00 HYPERCHOLESTEROLEMIA: Chronic | ICD-10-CM

## 2022-05-18 PROBLEM — E66.811 OBESITY, CLASS I, BMI 30-34.9: Status: ACTIVE | Noted: 2021-03-17

## 2022-05-18 PROCEDURE — 99214 PR OFFICE/OUTPT VISIT, EST, LEVL IV, 30-39 MIN: ICD-10-PCS | Mod: S$GLB,,, | Performed by: INTERNAL MEDICINE

## 2022-05-18 PROCEDURE — 99214 OFFICE O/P EST MOD 30 MIN: CPT | Mod: S$GLB,,, | Performed by: INTERNAL MEDICINE

## 2022-05-18 RX ORDER — HYDRALAZINE HYDROCHLORIDE 100 MG/1
100 TABLET, FILM COATED ORAL EVERY 12 HOURS
Qty: 180 TABLET | Refills: 1 | Status: SHIPPED | OUTPATIENT
Start: 2022-05-18 | End: 2022-07-11

## 2022-05-18 NOTE — PROGRESS NOTES
Subjective:    Patient ID:  Cat Meza is a 76 y.o. female who presents for Hypertension, Carotid Artery Disease, Coronary Artery Disease, and Valvular Heart Disease        HPI  DISCUSSED LABS AND GOALS LDL 51, HDL 45, HBA1C  6.5%, HAD LABS ALSO LAST WEEK, BP ON UPPER END, SEE ROS    Past Medical History:   Diagnosis Date    Acute coronary syndrome     LIGHT HEART ATTACK YEARS AGO ??    Bowel obstruction 2018    Cancer     breast    Cancer     lung    COPD (chronic obstructive pulmonary disease)     Coronary artery disease     Diabetes mellitus     Edema     Heart murmur     Hyperlipidemia     Hypertension     Thyroid disease     hypothyroid    Valvular regurgitation      Past Surgical History:   Procedure Laterality Date    BOWEL RESECTION  05/2018    BREAST SURGERY  2011    breast reconstruction    BREAST SURGERY  05/07/2018    implant exchange    CARDIAC CATHETERIZATION      CHOLECYSTECTOMY  5/31/2018    Procedure: CHOLECYSTECTOMY;  Surgeon: Zev Durán MD;  Location: [facility] OR;  Service: General;;  opened at 1010      COLON SURGERY      gall stones      HYSTERECTOMY      INCISION OF INTESTINE N/A 5/31/2018    Procedure: ENTEROTOMY- REPAIR;  Surgeon: Zev Durán MD;  Location: [facility] OR;  Service: General;  Laterality: N/A;    LAPAROSCOPIC CHOLECYSTECTOMY N/A 5/31/2018    Procedure: CHOLECYSTECTOMY, LAPAROSCOPIC- ATTEMPTED;  Surgeon: Zev Durán MD;  Location: [facility] OR;  Service: General;  Laterality: N/A;  attempted    LIPOMA RESECTION Right 5/31/2018    Procedure: EXCISION, LIPOMA;  Surgeon: Zev Durán MD;  Location: [facility] OR;  Service: General;  Laterality: Right;    LYSIS OF ADHESIONS  6/2/2018    Procedure: LYSIS, ADHESIONS;  Surgeon: Zev Durán MD;  Location: [facility] OR;  Service: General;;    MASTECTOMY Bilateral      with breast implants bilat     Family History   Problem Relation Age of Onset    Heart disease Mother     Cancer Father      Social History      Socioeconomic History    Marital status:    Tobacco Use    Smoking status: Former Smoker     Quit date: 2002     Years since quittin.7    Smokeless tobacco: Never Used   Substance and Sexual Activity    Alcohol use: No    Drug use: No    Sexual activity: Not Currently       Review of patient's allergies indicates:   Allergen Reactions    Adhesive     Niacin preparations     Penicillins     Phenergan [promethazine] Other (See Comments)     Could not wake up    Shellfish containing products     Ciprofloxacin Rash and Other (See Comments)     Pain at infusion site       Current Outpatient Medications:     A-C-E-zinc ox-selen AA-copper 1,000 unit-60 mg-30 unit Tab, Take 1 capsule by mouth., Disp: , Rfl:     alendronate (FOSAMAX) 70 MG tablet, , Disp: , Rfl:     ascorbic acid, vitamin C, (VITAMIN C) 1000 MG tablet, Take 500 mg by mouth once daily. , Disp: , Rfl:     aspirin 81 MG Chew, Take 81 mg by mouth once daily., Disp: , Rfl:     clindamycin (CLEOCIN) 300 MG capsule, TAKE 1 CAPSULE BY MOUTH EVERY 8 HOURS FOR 10 DAYS, Disp: , Rfl:     coenzyme Q10 200 mg capsule, Take 200 mg by mouth once daily., Disp: , Rfl:     cyanocobalamin (VITAMIN B-12) 1000 MCG tablet, Take 100 mcg by mouth once daily., Disp: , Rfl:     cycloSPORINE (RESTASIS) 0.05 % ophthalmic emulsion, 1 drop 2 (two) times daily., Disp: , Rfl:     docusate sodium (COLACE) 100 MG capsule, Take 100 mg by mouth every evening., Disp: , Rfl:     exemestane (AROMASIN) 25 mg tablet, , Disp: , Rfl:     ezetimibe (ZETIA) 10 mg tablet, Take 10 mg by mouth every evening. , Disp: , Rfl:     fenofibrate (TRICOR) 145 MG tablet, Take 145 mg by mouth once daily., Disp: , Rfl:     fluconazole (DIFLUCAN) 150 MG Tab, , Disp: , Rfl:     HYDROcodone-acetaminophen (NORCO) 7.5-325 mg per tablet, Take 1 tablet by mouth every 6 (six) hours as needed for Pain., Disp: 20 tablet, Rfl: 0    lactobacillus acidophilus & bulgar (LACTINEX) 100  million cell packet, Take 1 tablet by mouth every evening. , Disp: , Rfl:     losartan (COZAAR) 100 MG tablet, Take 100 mg by mouth once daily., Disp: , Rfl:     LYSINE HCL ORAL, Take by mouth., Disp: , Rfl:     magnesium oxide 400 mg magnesium Tab, Take 400 mg by mouth once daily. , Disp: , Rfl:     metformin (GLUCOPHAGE) 500 MG tablet, Take 500 mg by mouth daily with breakfast., Disp: , Rfl:     metoprolol succinate (TOPROL-XL) 25 MG 24 hr tablet, Take 25 mg by mouth every evening. , Disp: , Rfl:     montelukast (SINGULAIR) 10 mg tablet, Take 10 mg by mouth every evening., Disp: , Rfl:     omega-3 acid ethyl esters (LOVAZA) 1 gram capsule, Take 1 capsule by mouth 2 (two) times daily., Disp: , Rfl: 5    pantoprazole (PROTONIX) 40 MG tablet, , Disp: , Rfl:     potassium chloride (KLOR-CON) 10 MEQ TbSR, Take 1 tablet (10 mEq total) by mouth every other day., Disp: 45 tablet, Rfl: 1    SYNTHROID 75 mcg tablet, , Disp: , Rfl:     TRUE METRIX GLUCOSE TEST STRIP Strp, USE TO CHECK BLOOD SUGAR DAILY, Disp: , Rfl:     vitamin D 1000 units Tab, Take 2,000 Units by mouth once daily., Disp: , Rfl:     vitamin E 100 UNIT capsule, Take 100 Units by mouth once daily., Disp: , Rfl:     XIGDUO XR 5-500 mg TBph, , Disp: , Rfl:     hydrALAZINE (APRESOLINE) 100 MG tablet, Take 1 tablet (100 mg total) by mouth every 12 (twelve) hours., Disp: 180 tablet, Rfl: 1    hydroCHLOROthiazide (HYDRODIURIL) 12.5 MG Tab, Take 1 tablet (12.5 mg total) by mouth every other day., Disp: 45 tablet, Rfl: 1    nitroGLYCERIN (NITROSTAT) 0.4 MG SL tablet, Place 1 tablet (0.4 mg total) under the tongue every 5 (five) minutes as needed., Disp: 25 tablet, Rfl: 0    rosuvastatin (CRESTOR) 5 MG tablet, Take 1 tablet (5 mg total) by mouth once daily., Disp: 90 tablet, Rfl: 2    Review of Systems   Constitutional: Positive for weight gain. Negative for chills, diaphoresis, fever, malaise/fatigue and night sweats.   HENT: Negative.    Eyes:  "Negative for blurred vision and visual disturbance.   Cardiovascular: Negative for chest pain, claudication, cyanosis, irregular heartbeat, leg swelling (OCC), near-syncope, orthopnea, palpitations, paroxysmal nocturnal dyspnea and syncope. Dyspnea on exertion: MILD.   Respiratory: Negative for cough, hemoptysis and wheezing.    Endocrine: Negative.    Hematologic/Lymphatic: Negative for adenopathy. Does not bruise/bleed easily.   Skin: Negative for color change and rash.   Musculoskeletal: Positive for arthritis. Negative for back pain and falls.   Gastrointestinal: Negative for abdominal pain, change in bowel habit, dysphagia, jaundice, melena and nausea.   Genitourinary: Negative for dysuria and flank pain.   Neurological: Negative for focal weakness, light-headedness, loss of balance and weakness.   Psychiatric/Behavioral: Negative for altered mental status and depression.   Allergic/Immunologic: Negative.         Objective:      Vitals:    05/18/22 1042 05/18/22 1109   BP: (!) 163/85 138/82   Pulse: 76    Weight: 84.2 kg (185 lb 10 oz)    Height: 5' 5" (1.651 m)    PainSc: 0-No pain      Body mass index is 30.89 kg/m².    Physical Exam  Constitutional:       Appearance: She is overweight.   HENT:      Head: Normocephalic and atraumatic.   Eyes:      General: No scleral icterus.     Extraocular Movements: Extraocular movements intact.   Neck:      Vascular: Normal carotid pulses. Carotid bruit present. No JVD.   Cardiovascular:      Rate and Rhythm: Regular rhythm.      Pulses:           Carotid pulses are 2+ on the right side and 2+ on the left side.       Radial pulses are 2+ on the right side and 2+ on the left side.        Posterior tibial pulses are 2+ on the right side and 2+ on the left side.      Heart sounds: Murmur heard.    Systolic murmur is present with a grade of 1/6 at the lower left sternal border and apex.    No friction rub. No gallop.   Pulmonary:      Effort: Pulmonary effort is normal.      " Breath sounds: Normal breath sounds. No rales.   Abdominal:      Palpations: Abdomen is soft.      Tenderness: There is no abdominal tenderness.   Musculoskeletal:      Cervical back: Neck supple.      Right lower leg: No edema.      Left lower leg: No edema.   Skin:     General: Skin is warm and dry.      Capillary Refill: Capillary refill takes less than 2 seconds.   Neurological:      General: No focal deficit present.      Mental Status: She is alert and oriented to person, place, and time.      Cranial Nerves: Cranial nerves are intact.   Psychiatric:         Mood and Affect: Mood normal.         Speech: Speech normal.         Behavior: Behavior normal.                 ..    Chemistry        Component Value Date/Time     10/20/2021 0820    K 4.6 10/20/2021 0820     10/20/2021 0820    CO2 24 10/20/2021 0820    BUN 13 10/20/2021 0820    CREATININE 0.8 10/20/2021 0820     (H) 10/20/2021 0820        Component Value Date/Time    CALCIUM 8.9 10/20/2021 0820    ALKPHOS 43 (L) 10/20/2021 0820    AST 18 10/20/2021 0820    ALT 13 10/20/2021 0820    BILITOT 0.7 10/20/2021 0820    ESTGFRAFRICA >60.0 10/20/2021 0820    EGFRNONAA >60.0 10/20/2021 0820            ..  Lab Results   Component Value Date    CHOL 111 (L) 11/18/2021    CHOL 121 05/04/2021    CHOL 127 07/02/2020     Lab Results   Component Value Date    HDL 45 11/18/2021    HDL 40 05/04/2021    HDL 48 07/02/2020     Lab Results   Component Value Date    LDLCALC 51.4 (L) 11/18/2021    LDLCALC 52.8 (L) 05/04/2021    LDLCALC 54.6 (L) 07/02/2020     Lab Results   Component Value Date    TRIG 73 11/18/2021    TRIG 141 05/04/2021    TRIG 122 07/02/2020     Lab Results   Component Value Date    CHOLHDL 40.5 11/18/2021    CHOLHDL 33.1 05/04/2021    CHOLHDL 37.8 07/02/2020     ..  Lab Results   Component Value Date    WBC 9.22 10/20/2021    HGB 15.2 10/20/2021    HCT 45.7 10/20/2021    MCV 93 10/20/2021     10/20/2021       Test(s) Reviewed  I have  reviewed the following in detail:  [] Stress test   [] Angiography   [] Echocardiogram   [x] Labs   [] Other:       Assessment:         ICD-10-CM ICD-9-CM   1. Coronary artery disease of native artery of native heart with stable angina pectoris  I25.118 414.01     413.9   2. Non-rheumatic mitral regurgitation  I34.0 424.0   3. Essential (primary) hypertension  I10 401.9   4. Hypercholesterolemia  E78.00 272.0   5. Obesity, Class I, BMI 30-34.9  E66.9 278.00     Problem List Items Addressed This Visit        Cardiac/Vascular    Essential (primary) hypertension    Relevant Orders    Ankle Brachial Indices (KATIA)    Coronary artery disease of native artery of native heart with stable angina pectoris - Primary    Relevant Orders    Ankle Brachial Indices (KATIA)    Non-rheumatic mitral regurgitation    Hypercholesterolemia    Relevant Orders    Ankle Brachial Indices (KATIA)       Endocrine    Obesity, Class I, BMI 30-34.9           Plan:     INCREASE HYDRALAZINE  BID, WATCH BLOOD PRESSURE LABS FROM [facility], CHECK,KATIA, ALL CV CLINICALLY STABLE, CLASS 1 ANGINA, NO HF, NO TIA, NO CLINICAL ARRHYTHMIA,CONTINUE CURRENT MEDS, EDUCATION, DIET, EXERCISE , WEIGHT LOSS RETURN TO CLINIC IN 6 MO      Coronary artery disease of native artery of native heart with stable angina pectoris  Comments:  STABLE  Orders:  -     Ankle Brachial Indices (KATIA); Future    Non-rheumatic mitral regurgitation  Comments:  CLINICALLY STABLE    Essential (primary) hypertension  Comments:  ADJUST MEDS  Orders:  -     Ankle Brachial Indices (KATIA); Future    Hypercholesterolemia  Comments:  DIET AND MEDS  Orders:  -     Ankle Brachial Indices (KATIA); Future    Obesity, Class I, BMI 30-34.9  Comments:  WEIGHT LOSS    Other orders  -     hydrALAZINE (APRESOLINE) 100 MG tablet; Take 1 tablet (100 mg total) by mouth every 12 (twelve) hours.  Dispense: 180 tablet; Refill: 1    RTC Low level/low impact aerobic exercise 5x's/wk. Heart healthy diet and risk factor  modification.    See labs and med orders.    Aerobic exercise 5x's/wk. Heart healthy diet and risk factor modification.    See labs and med orders.

## 2022-07-05 ENCOUNTER — TELEPHONE (OUTPATIENT)
Dept: CARDIOLOGY | Facility: CLINIC | Age: 77
End: 2022-07-05
Payer: MEDICARE

## 2022-07-05 NOTE — TELEPHONE ENCOUNTER
----- Message from Shavon Rahman sent at 7/5/2022 10:19 AM CDT -----  Contact: [email or web address]  Type:  Sooner Appointment Request    Caller is requesting a sooner appointment.  Caller declined first available appointment listed below.  Caller will not accept being placed on the waitlist and is requesting a message be sent to doctor.    Name of Caller:  Patient  When is the first available appointment?  N/a  Symptoms:  patient is needing to schedule an appt for cardiac clearance for an upcoming surgery  Best Call Back Number:  [phone]  Additional Information:  Thank You

## 2023-01-11 ENCOUNTER — OFFICE VISIT (OUTPATIENT)
Dept: CARDIOLOGY | Facility: CLINIC | Age: 78
End: 2023-01-11
Payer: MEDICARE

## 2023-01-11 VITALS
DIASTOLIC BLOOD PRESSURE: 83 MMHG | WEIGHT: 172.63 LBS | HEART RATE: 78 BPM | BODY MASS INDEX: 30.59 KG/M2 | SYSTOLIC BLOOD PRESSURE: 135 MMHG | HEIGHT: 63 IN

## 2023-01-11 DIAGNOSIS — E78.00 HYPERCHOLESTEROLEMIA: Chronic | ICD-10-CM

## 2023-01-11 DIAGNOSIS — I34.0 NON-RHEUMATIC MITRAL REGURGITATION: Chronic | ICD-10-CM

## 2023-01-11 DIAGNOSIS — I65.23 CAROTID ARTERY PLAQUE, BILATERAL: Chronic | ICD-10-CM

## 2023-01-11 DIAGNOSIS — E66.9 OBESITY, CLASS I, BMI 30-34.9: Chronic | ICD-10-CM

## 2023-01-11 DIAGNOSIS — I10 ESSENTIAL (PRIMARY) HYPERTENSION: Chronic | ICD-10-CM

## 2023-01-11 DIAGNOSIS — I25.118 CORONARY ARTERY DISEASE OF NATIVE ARTERY OF NATIVE HEART WITH STABLE ANGINA PECTORIS: Primary | Chronic | ICD-10-CM

## 2023-01-11 PROCEDURE — 3075F PR MOST RECENT SYSTOLIC BLOOD PRESS GE 130-139MM HG: ICD-10-PCS | Mod: CPTII,S$GLB,, | Performed by: INTERNAL MEDICINE

## 2023-01-11 PROCEDURE — 1101F PT FALLS ASSESS-DOCD LE1/YR: CPT | Mod: CPTII,S$GLB,, | Performed by: INTERNAL MEDICINE

## 2023-01-11 PROCEDURE — 1101F PR PT FALLS ASSESS DOC 0-1 FALLS W/OUT INJ PAST YR: ICD-10-PCS | Mod: CPTII,S$GLB,, | Performed by: INTERNAL MEDICINE

## 2023-01-11 PROCEDURE — 3075F SYST BP GE 130 - 139MM HG: CPT | Mod: CPTII,S$GLB,, | Performed by: INTERNAL MEDICINE

## 2023-01-11 PROCEDURE — 3079F DIAST BP 80-89 MM HG: CPT | Mod: CPTII,S$GLB,, | Performed by: INTERNAL MEDICINE

## 2023-01-11 PROCEDURE — 1159F PR MEDICATION LIST DOCUMENTED IN MEDICAL RECORD: ICD-10-PCS | Mod: CPTII,S$GLB,, | Performed by: INTERNAL MEDICINE

## 2023-01-11 PROCEDURE — 99214 OFFICE O/P EST MOD 30 MIN: CPT | Mod: S$GLB,,, | Performed by: INTERNAL MEDICINE

## 2023-01-11 PROCEDURE — 1126F PR PAIN SEVERITY QUANTIFIED, NO PAIN PRESENT: ICD-10-PCS | Mod: CPTII,S$GLB,, | Performed by: INTERNAL MEDICINE

## 2023-01-11 PROCEDURE — 3079F PR MOST RECENT DIASTOLIC BLOOD PRESSURE 80-89 MM HG: ICD-10-PCS | Mod: CPTII,S$GLB,, | Performed by: INTERNAL MEDICINE

## 2023-01-11 PROCEDURE — 3288F PR FALLS RISK ASSESSMENT DOCUMENTED: ICD-10-PCS | Mod: CPTII,S$GLB,, | Performed by: INTERNAL MEDICINE

## 2023-01-11 PROCEDURE — 99214 PR OFFICE/OUTPT VISIT, EST, LEVL IV, 30-39 MIN: ICD-10-PCS | Mod: S$GLB,,, | Performed by: INTERNAL MEDICINE

## 2023-01-11 PROCEDURE — 3288F FALL RISK ASSESSMENT DOCD: CPT | Mod: CPTII,S$GLB,, | Performed by: INTERNAL MEDICINE

## 2023-01-11 PROCEDURE — 1159F MED LIST DOCD IN RCRD: CPT | Mod: CPTII,S$GLB,, | Performed by: INTERNAL MEDICINE

## 2023-01-11 PROCEDURE — 1126F AMNT PAIN NOTED NONE PRSNT: CPT | Mod: CPTII,S$GLB,, | Performed by: INTERNAL MEDICINE

## 2023-01-11 RX ORDER — EZETIMIBE 10 MG/1
10 TABLET ORAL NIGHTLY
Qty: 90 TABLET | Refills: 2 | Status: SHIPPED | OUTPATIENT
Start: 2023-01-11 | End: 2023-05-30

## 2023-01-11 NOTE — PROGRESS NOTES
Subjective:    Patient ID:  Cat Meza is a 77 y.o. female who presents for Hypertension        Hypertension  Pertinent negatives include no blurred vision, chest pain, malaise/fatigue, orthopnea, palpitations or PND.   [facility] [place] [facility], DOING [place], MILD ANGINA NO TIA TYPE SYMPTOMS NO NEAR-SYNCOPE, SEE ROS    Past Medical History:   Diagnosis Date    Acute coronary syndrome     LIGHT HEART ATTACK YEARS AGO ??    Bowel obstruction 2018    Cancer     breast    Cancer     lung    COPD (chronic obstructive pulmonary disease)     Coronary artery disease     Diabetes mellitus     Edema     Heart murmur     Hyperlipidemia     Hypertension     Thyroid disease     hypothyroid    Valvular regurgitation      Past Surgical History:   Procedure Laterality Date    BOWEL RESECTION  05/2018    BREAST SURGERY  2011    breast reconstruction    BREAST SURGERY  05/07/2018    implant exchange    CARDIAC CATHETERIZATION      CHOLECYSTECTOMY  5/31/2018    Procedure: CHOLECYSTECTOMY;  Surgeon: Zev Durán MD;  Location: [facility] OR;  Service: General;;  opened at 1010      COLON SURGERY      gall stones      HYSTERECTOMY      INCISION OF INTESTINE N/A 5/31/2018    Procedure: ENTEROTOMY- REPAIR;  Surgeon: Zev Durán MD;  Location: [facility] OR;  Service: General;  Laterality: N/A;    LAPAROSCOPIC CHOLECYSTECTOMY N/A 5/31/2018    Procedure: CHOLECYSTECTOMY, LAPAROSCOPIC- ATTEMPTED;  Surgeon: Zev Durán MD;  Location: [facility] OR;  Service: General;  Laterality: N/A;  attempted    LIPOMA RESECTION Right 5/31/2018    Procedure: EXCISION, LIPOMA;  Surgeon: Zev Durán MD;  Location: [facility] OR;  Service: General;  Laterality: Right;    LYSIS OF ADHESIONS  6/2/2018    Procedure: LYSIS, ADHESIONS;  Surgeon: Zev Durán MD;  Location: [facility] OR;  Service: General;;    MASTECTOMY Bilateral      with breast implants bilat     Family History   Problem Relation Age of Onset    Heart disease Mother     Cancer Father      Social History     Socioeconomic  History    Marital status:    Tobacco Use    Smoking status: Former     Types: Cigarettes     Quit date: 2002     Years since quittin.4    Smokeless tobacco: Never   Substance and Sexual Activity    Alcohol use: No    Drug use: No    Sexual activity: Not Currently       Review of patient's allergies indicates:   Allergen Reactions    Adhesive     Niacin preparations     Penicillins     Phenergan [promethazine] Other (See Comments)     Could not wake up    Shellfish containing products     Ciprofloxacin Rash and Other (See Comments)     Pain at infusion site       Current Outpatient Medications:     ascorbic acid, vitamin C, (VITAMIN C) 1000 MG tablet, Take 500 mg by mouth once daily. , Disp: , Rfl:     aspirin 81 MG Chew, Take 81 mg by mouth once daily., Disp: , Rfl:     cyanocobalamin (VITAMIN B-12) 1000 MCG tablet, Take 100 mcg by mouth once daily., Disp: , Rfl:     cycloSPORINE (RESTASIS) 0.05 % ophthalmic emulsion, 1 drop 2 (two) times daily., Disp: , Rfl:     exemestane (AROMASIN) 25 mg tablet, , Disp: , Rfl:     fenofibrate (TRICOR) 145 MG tablet, Take 145 mg by mouth once daily., Disp: , Rfl:     hydrALAZINE (APRESOLINE) 100 MG tablet, TAKE 1 TABLET (100 MG TOTAL) BY MOUTH EVERY 12 (TWELVE) HOURS., Disp: 180 tablet, Rfl: 1    hydroCHLOROthiazide (HYDRODIURIL) 12.5 MG Tab, Take 1 tablet (12.5 mg total) by mouth every other day., Disp: 45 tablet, Rfl: 1    losartan (COZAAR) 100 MG tablet, Take 100 mg by mouth once daily., Disp: , Rfl:     LYSINE HCL ORAL, Take by mouth., Disp: , Rfl:     magnesium oxide 400 mg magnesium Tab, Take 400 mg by mouth once daily. , Disp: , Rfl:     metformin (GLUCOPHAGE) 500 MG tablet, Take 500 mg by mouth daily with breakfast., Disp: , Rfl:     metoprolol succinate (TOPROL-XL) 25 MG 24 hr tablet, Take 25 mg by mouth every evening. , Disp: , Rfl:     nitroGLYCERIN (NITROSTAT) 0.4 MG SL tablet, Place 1 tablet (0.4 mg total) under the tongue every 5 (five) minutes as  needed., Disp: 25 tablet, Rfl: 0    pantoprazole (PROTONIX) 40 MG tablet, , Disp: , Rfl:     potassium chloride (KLOR-CON) 10 MEQ TbSR, Take 1 tablet (10 mEq total) by mouth every other day., Disp: 45 tablet, Rfl: 1    rosuvastatin (CRESTOR) 5 MG tablet, Take 1 tablet (5 mg total) by mouth once daily., Disp: 90 tablet, Rfl: 2    SYNTHROID 75 mcg tablet, 100 mcg., Disp: , Rfl:     vitamin D 1000 units Tab, Take 2,000 Units by mouth once daily., Disp: , Rfl:     vitamin E 100 UNIT capsule, Take 100 Units by mouth once daily., Disp: , Rfl:     XIGDUO XR 5-500 mg TBph, , Disp: , Rfl:     A-C-E-zinc ox-selen AA-copper 1,000 unit-60 mg-30 unit Tab, Take 1 capsule by mouth., Disp: , Rfl:     alendronate (FOSAMAX) 70 MG tablet, , Disp: , Rfl:     clindamycin (CLEOCIN) 300 MG capsule, TAKE 1 CAPSULE BY MOUTH EVERY 8 HOURS FOR 10 DAYS, Disp: , Rfl:     coenzyme Q10 200 mg capsule, Take 200 mg by mouth once daily., Disp: , Rfl:     docusate sodium (COLACE) 100 MG capsule, Take 100 mg by mouth every evening., Disp: , Rfl:     ezetimibe (ZETIA) 10 mg tablet, Take 1 tablet (10 mg total) by mouth every evening., Disp: 90 tablet, Rfl: 2    fluconazole (DIFLUCAN) 150 MG Tab, , Disp: , Rfl:     HYDROcodone-acetaminophen (NORCO) 7.5-325 mg per tablet, Take 1 tablet by mouth every 6 (six) hours as needed for Pain. (Patient not taking: Reported on 1/11/2023), Disp: 20 tablet, Rfl: 0    lactobacillus acidophilus & bulgar (LACTINEX) 100 million cell packet, Take 1 tablet by mouth every evening. , Disp: , Rfl:     montelukast (SINGULAIR) 10 mg tablet, Take 10 mg by mouth every evening., Disp: , Rfl:     omega-3 acid ethyl esters (LOVAZA) 1 gram capsule, Take 1 capsule by mouth 2 (two) times daily., Disp: , Rfl: 5    TRUE METRIX GLUCOSE TEST STRIP Strp, USE TO CHECK BLOOD SUGAR DAILY, Disp: , Rfl:     Review of Systems   Constitutional: Negative for chills, diaphoresis, fever, malaise/fatigue, night sweats and weight gain.   HENT: Negative.  "    Eyes:  Negative for blurred vision and visual disturbance.   Cardiovascular:  Negative for chest pain, claudication, cyanosis, dyspnea on exertion (MILD), irregular heartbeat, leg swelling, near-syncope, orthopnea, palpitations, paroxysmal nocturnal dyspnea and syncope.   Respiratory:  Negative for cough, hemoptysis and wheezing.    Endocrine: Negative.    Hematologic/Lymphatic: Negative for adenopathy. Does not bruise/bleed easily.   Skin:  Negative for color change and rash.   Musculoskeletal:  Negative for back pain and falls.   Gastrointestinal:  Negative for abdominal pain, change in bowel habit, dysphagia, jaundice, melena and nausea.   Genitourinary:  Negative for dysuria and flank pain.   Neurological:  Negative for focal weakness, light-headedness, loss of balance and weakness. Numbness: R SHOULDER/SINCE RADIATION.  Psychiatric/Behavioral:  Negative for altered mental status and depression.    Allergic/Immunologic: Negative for hives and persistent infections.      Objective:      Vitals:    01/11/23 0834   BP: 135/83   Pulse: 78   Weight: 78.3 kg (172 lb 9.9 oz)   Height: 5' 3" (1.6 m)   PainSc: 0-No pain     Body mass index is 30.58 kg/m².    Physical Exam  Constitutional:       Appearance: She is overweight.   HENT:      Head: Normocephalic and atraumatic.   Eyes:      General: No scleral icterus.     Extraocular Movements: Extraocular movements intact.      Conjunctiva/sclera: Conjunctivae normal.   Neck:      Vascular: Normal carotid pulses. No JVD.   Cardiovascular:      Rate and Rhythm: Regular rhythm. No extrasystoles are present.     Pulses:           Carotid pulses are 2+ on the right side and 2+ on the left side.       Radial pulses are 2+ on the right side and 2+ on the left side.        Posterior tibial pulses are 2+ on the right side and 2+ on the left side.      Heart sounds: Murmur heard.   Systolic murmur is present with a grade of 1/6 at the lower left sternal border.     No friction " rub. No gallop.   Pulmonary:      Effort: Pulmonary effort is normal.      Breath sounds: Normal breath sounds. No rales.   Abdominal:      Palpations: Abdomen is soft.      Tenderness: There is no abdominal tenderness.   Musculoskeletal:      Cervical back: Neck supple.      Right lower leg: No edema.      Left lower leg: No edema.   Skin:     General: Skin is warm and dry.      Capillary Refill: Capillary refill takes less than 2 seconds.   Neurological:      General: No focal deficit present.      Mental Status: She is alert and oriented to person, place, and time.      Cranial Nerves: Cranial nerves 2-12 are intact.   Psychiatric:         Mood and Affect: Mood normal.         Speech: Speech normal.         Behavior: Behavior normal.               ..    Chemistry        Component Value Date/Time     10/20/2021 0820    K 4.6 10/20/2021 0820     10/20/2021 0820    CO2 24 10/20/2021 0820    BUN 13 10/20/2021 0820    CREATININE 0.8 10/20/2021 0820     (H) 10/20/2021 0820        Component Value Date/Time    CALCIUM 8.9 10/20/2021 0820    ALKPHOS 43 (L) 10/20/2021 0820    AST 18 10/20/2021 0820    ALT 13 10/20/2021 0820    BILITOT 0.7 10/20/2021 0820    ESTGFRAFRICA >60.0 10/20/2021 0820    EGFRNONAA >60.0 10/20/2021 0820            ..  Lab Results   Component Value Date    CHOL 111 (L) 11/18/2021    CHOL 121 05/04/2021    CHOL 127 07/02/2020     Lab Results   Component Value Date    HDL 45 11/18/2021    HDL 40 05/04/2021    HDL 48 07/02/2020     Lab Results   Component Value Date    LDLCALC 51.4 (L) 11/18/2021    LDLCALC 52.8 (L) 05/04/2021    LDLCALC 54.6 (L) 07/02/2020     Lab Results   Component Value Date    TRIG 73 11/18/2021    TRIG 141 05/04/2021    TRIG 122 07/02/2020     Lab Results   Component Value Date    CHOLHDL 40.5 11/18/2021    CHOLHDL 33.1 05/04/2021    CHOLHDL 37.8 07/02/2020     ..  Lab Results   Component Value Date    WBC 9.22 10/20/2021    HGB 15.2 10/20/2021    HCT 45.7  10/20/2021    MCV 93 10/20/2021     10/20/2021       Test(s) Reviewed  I have reviewed the following in detail:  [] Stress test   [] Angiography   [] Echocardiogram   [x] Labs   [] Other:       Assessment:         ICD-10-CM ICD-9-CM   1. Coronary artery disease of native artery of native heart with stable angina pectoris  I25.118 414.01     413.9   2. Non-rheumatic mitral regurgitation  I34.0 424.0   3. Carotid artery plaque, bilateral  I65.23 433.10     433.30   4. Hypercholesterolemia  E78.00 272.0   5. Essential (primary) hypertension  I10 401.9   6. Obesity, Class I, BMI 30-34.9  E66.9 278.00     Problem List Items Addressed This Visit          Cardiac/Vascular    Essential (primary) hypertension    Relevant Orders    COMPREHENSIVE METABOLIC PANEL    Coronary artery disease of native artery of native heart with stable angina pectoris - Primary    Relevant Orders    CBC Auto Differential    COMPREHENSIVE METABOLIC PANEL    Non-rheumatic mitral regurgitation    Hypercholesterolemia    Relevant Orders    CBC Auto Differential    COMPREHENSIVE METABOLIC PANEL    Carotid artery plaque, bilateral       Endocrine    Obesity, Class I, BMI 30-34.9        Plan:         LABS FROM [facility], ALL CV CLINICALLY STABLE, CLASS 1 ANGINA, NO HF, NO TIA, NO CLINICAL ARRHYTHMIA,CONTINUE CURRENT MEDS, EDUCATION, DIET, EXERCISE , WEIGHT LOSS RETURN TO CLINIC IN 9 MO WITH LABS  Coronary artery disease of native artery of native heart with stable angina pectoris  -     CBC Auto Differential; Future; Expected date: 01/11/2023  -     COMPREHENSIVE METABOLIC PANEL; Future; Expected date: 01/11/2023    Non-rheumatic mitral regurgitation    Carotid artery plaque, bilateral  Comments:  NO TIA    Hypercholesterolemia  -     CBC Auto Differential; Future; Expected date: 01/11/2023  -     COMPREHENSIVE METABOLIC PANEL; Future; Expected date: 01/11/2023    Essential (primary) hypertension  Comments:  CONTROLLED  Orders:  -     COMPREHENSIVE  METABOLIC PANEL; Future; Expected date: 01/11/2023    Obesity, Class I, BMI 30-34.9  Comments:  CONTINUE WITH DIET    Other orders  -     ezetimibe (ZETIA) 10 mg tablet; Take 1 tablet (10 mg total) by mouth every evening.  Dispense: 90 tablet; Refill: 2    RTC Low level/low impact aerobic exercise 5x's/wk. Heart healthy diet and risk factor modification.    See labs and med orders.    Aerobic exercise 5x's/wk. Heart healthy diet and risk factor modification.    See labs and med orders.

## 2023-05-30 RX ORDER — EZETIMIBE 10 MG/1
TABLET ORAL
Qty: 90 TABLET | Refills: 2 | Status: SHIPPED | OUTPATIENT
Start: 2023-05-30 | End: 2024-03-01

## 2023-08-01 RX ORDER — HYDRALAZINE HYDROCHLORIDE 100 MG/1
100 TABLET, FILM COATED ORAL EVERY 12 HOURS
Qty: 180 TABLET | Refills: 1 | Status: SHIPPED | OUTPATIENT
Start: 2023-08-01 | End: 2023-11-27

## 2023-09-01 ENCOUNTER — TELEPHONE (OUTPATIENT)
Dept: CARDIOTHORACIC SURGERY | Facility: HOSPITAL | Age: 78
End: 2023-09-01
Payer: MEDICARE

## 2023-09-01 NOTE — TELEPHONE ENCOUNTER
No info in chart. Called and left voicemail     CB     ----- Message from Mary Howard sent at 9/1/2023 12:35 PM CDT -----  Regarding: Appt  Contact: Pt @ [phone]  Pt has cancer in lungs and asking to get appt asap. Asking for a call back

## 2023-09-01 NOTE — TELEPHONE ENCOUNTER
Returned call to patient. She stated her oncologist Dr. Alatorre at [facility] would like her seen for a recent diagnosis of lung cancer. Most recent appointment  or  with oncologist.     Reports admitted for bowel obstruction. CT showed new lung nodule. PET scan followed percutaneous biopsy.     Hx of right sided lung cancer in  s/p resection.     No recent PFTS.     Will need images and office notes to get patient scheduled. Including path result. Need new PFTs.     ----- Message from Velia Junior sent at 2023  2:29 PM CDT -----  Regarding: returning c/b  Contact: pt @[phone]  Pt is returning a missed call from someone in the office and is asking for a return call back soon. Thanks.         Reason for call: returning call to get scheduled         Patient's DX: n/a         Patient requesting call back or [facility] [phone]

## 2023-09-05 ENCOUNTER — TELEPHONE (OUTPATIENT)
Dept: HEMATOLOGY/ONCOLOGY | Facility: CLINIC | Age: 78
End: 2023-09-05
Payer: MEDICARE

## 2023-09-05 NOTE — TELEPHONE ENCOUNTER
Attempted to contact MsLaquita Hamiltonrica.  Left a VM and included my call back information.  Will try again.

## 2023-09-05 NOTE — TELEPHONE ENCOUNTER
Patient notified of the scheduled appointment with Dr. Gaming for 09/15/23.  Reminder mailed.  Records uploaded to .

## 2023-09-13 ENCOUNTER — TELEPHONE (OUTPATIENT)
Dept: HEMATOLOGY/ONCOLOGY | Facility: CLINIC | Age: 78
End: 2023-09-13
Payer: MEDICARE

## 2023-09-13 DIAGNOSIS — C34.90 MALIGNANT NEOPLASM OF UNSPECIFIED PART OF UNSPECIFIED BRONCHUS OR LUNG: Primary | ICD-10-CM

## 2023-10-11 ENCOUNTER — OFFICE VISIT (OUTPATIENT)
Dept: CARDIOLOGY | Facility: CLINIC | Age: 78
End: 2023-10-11
Payer: MEDICARE

## 2023-10-11 VITALS
DIASTOLIC BLOOD PRESSURE: 86 MMHG | BODY MASS INDEX: 28.06 KG/M2 | WEIGHT: 168.44 LBS | HEIGHT: 65 IN | SYSTOLIC BLOOD PRESSURE: 138 MMHG

## 2023-10-11 DIAGNOSIS — E11.59 HYPERTENSION ASSOCIATED WITH DIABETES: Chronic | ICD-10-CM

## 2023-10-11 DIAGNOSIS — E66.3 OVERWEIGHT (BMI 25.0-29.9): Chronic | ICD-10-CM

## 2023-10-11 DIAGNOSIS — I34.0 NON-RHEUMATIC MITRAL REGURGITATION: Chronic | ICD-10-CM

## 2023-10-11 DIAGNOSIS — E78.00 HYPERCHOLESTEROLEMIA: Chronic | ICD-10-CM

## 2023-10-11 DIAGNOSIS — I15.2 HYPERTENSION ASSOCIATED WITH DIABETES: Chronic | ICD-10-CM

## 2023-10-11 DIAGNOSIS — I25.118 CORONARY ARTERY DISEASE OF NATIVE ARTERY OF NATIVE HEART WITH STABLE ANGINA PECTORIS: Primary | Chronic | ICD-10-CM

## 2023-10-11 DIAGNOSIS — I65.23 CAROTID ARTERY PLAQUE, BILATERAL: Chronic | ICD-10-CM

## 2023-10-11 PROBLEM — R09.89 BRUIT OF LEFT CAROTID ARTERY: Status: RESOLVED | Noted: 2020-07-08 | Resolved: 2023-10-11

## 2023-10-11 PROCEDURE — 1101F PT FALLS ASSESS-DOCD LE1/YR: CPT | Mod: CPTII,S$GLB,, | Performed by: INTERNAL MEDICINE

## 2023-10-11 PROCEDURE — 1159F MED LIST DOCD IN RCRD: CPT | Mod: CPTII,S$GLB,, | Performed by: INTERNAL MEDICINE

## 2023-10-11 PROCEDURE — 3079F DIAST BP 80-89 MM HG: CPT | Mod: CPTII,S$GLB,, | Performed by: INTERNAL MEDICINE

## 2023-10-11 PROCEDURE — 3288F PR FALLS RISK ASSESSMENT DOCUMENTED: ICD-10-PCS | Mod: CPTII,S$GLB,, | Performed by: INTERNAL MEDICINE

## 2023-10-11 PROCEDURE — 1159F PR MEDICATION LIST DOCUMENTED IN MEDICAL RECORD: ICD-10-PCS | Mod: CPTII,S$GLB,, | Performed by: INTERNAL MEDICINE

## 2023-10-11 PROCEDURE — 99214 PR OFFICE/OUTPT VISIT, EST, LEVL IV, 30-39 MIN: ICD-10-PCS | Mod: S$GLB,,, | Performed by: INTERNAL MEDICINE

## 2023-10-11 PROCEDURE — 3288F FALL RISK ASSESSMENT DOCD: CPT | Mod: CPTII,S$GLB,, | Performed by: INTERNAL MEDICINE

## 2023-10-11 PROCEDURE — 1126F AMNT PAIN NOTED NONE PRSNT: CPT | Mod: CPTII,S$GLB,, | Performed by: INTERNAL MEDICINE

## 2023-10-11 PROCEDURE — 1126F PR PAIN SEVERITY QUANTIFIED, NO PAIN PRESENT: ICD-10-PCS | Mod: CPTII,S$GLB,, | Performed by: INTERNAL MEDICINE

## 2023-10-11 PROCEDURE — 99214 OFFICE O/P EST MOD 30 MIN: CPT | Mod: S$GLB,,, | Performed by: INTERNAL MEDICINE

## 2023-10-11 PROCEDURE — 3075F SYST BP GE 130 - 139MM HG: CPT | Mod: CPTII,S$GLB,, | Performed by: INTERNAL MEDICINE

## 2023-10-11 PROCEDURE — 3075F PR MOST RECENT SYSTOLIC BLOOD PRESS GE 130-139MM HG: ICD-10-PCS | Mod: CPTII,S$GLB,, | Performed by: INTERNAL MEDICINE

## 2023-10-11 PROCEDURE — 3079F PR MOST RECENT DIASTOLIC BLOOD PRESSURE 80-89 MM HG: ICD-10-PCS | Mod: CPTII,S$GLB,, | Performed by: INTERNAL MEDICINE

## 2023-10-11 PROCEDURE — 1101F PR PT FALLS ASSESS DOC 0-1 FALLS W/OUT INJ PAST YR: ICD-10-PCS | Mod: CPTII,S$GLB,, | Performed by: INTERNAL MEDICINE

## 2023-10-11 RX ORDER — OLMESARTAN MEDOXOMIL 40 MG/1
40 TABLET ORAL DAILY
Qty: 90 TABLET | Refills: 1 | Status: SHIPPED | OUTPATIENT
Start: 2023-10-11 | End: 2024-10-10

## 2023-10-11 NOTE — PROGRESS NOTES
Subjective:    Patient ID:  Cat Meza is a 77 y.o. female who presents for Follow-up and Coronary Artery Disease        Follow-up  Pertinent negatives include no abdominal pain, change in bowel habit, chest pain, chills, congestion, coughing, diaphoresis, fever, nausea, rash or weakness.   Coronary Artery Disease  Pertinent negatives include no chest pain, leg swelling, palpitations or weight gain.     RECURRENT R LUNG CANCER STAGE 1, SAME SIDE, , TO HAVE RADIATION,DX AFTER BOWEL OBSTRUCTION WAS AT [facility],  RECENT LABS NOTED, CMP OK, BP ON UPPER END WITH STRESS, SEE ROS    Past Medical History:   Diagnosis Date    Acute coronary syndrome     LIGHT HEART ATTACK YEARS AGO ??    Bowel obstruction 2018    Cancer     breast    Cancer     lung    COPD (chronic obstructive pulmonary disease)     Coronary artery disease     Diabetes mellitus     Edema     Heart murmur     Hyperlipidemia     Hypertension     Thyroid disease     hypothyroid    Valvular regurgitation      Past Surgical History:   Procedure Laterality Date    BOWEL RESECTION  05/2018    BREAST SURGERY  2011    breast reconstruction    BREAST SURGERY  05/07/2018    implant exchange    CARDIAC CATHETERIZATION      CHOLECYSTECTOMY  5/31/2018    Procedure: CHOLECYSTECTOMY;  Surgeon: Zev Durán MD;  Location: [facility] OR;  Service: General;;  opened at 1010      COLON SURGERY      gall stones      HYSTERECTOMY      INCISION OF INTESTINE N/A 5/31/2018    Procedure: ENTEROTOMY- REPAIR;  Surgeon: Zev Durán MD;  Location: [facility] OR;  Service: General;  Laterality: N/A;    LAPAROSCOPIC CHOLECYSTECTOMY N/A 5/31/2018    Procedure: CHOLECYSTECTOMY, LAPAROSCOPIC- ATTEMPTED;  Surgeon: Zev Durán MD;  Location: [facility] OR;  Service: General;  Laterality: N/A;  attempted    LIPOMA RESECTION Right 5/31/2018    Procedure: EXCISION, LIPOMA;  Surgeon: Zev Durán MD;  Location: [facility] OR;  Service: General;  Laterality: Right;    LYSIS OF ADHESIONS  6/2/2018    Procedure:  LYSIS, ADHESIONS;  Surgeon: Zev Durán MD;  Location: [facility];  Service: General;;    MASTECTOMY Bilateral      with breast implants bilat     Family History   Problem Relation Age of Onset    Heart disease Mother     Cancer Father      Social History     Socioeconomic History    Marital status:    Tobacco Use    Smoking status: Former     Current packs/day: 0.00     Types: Cigarettes     Quit date: 2002     Years since quittin.1    Smokeless tobacco: Never   Substance and Sexual Activity    Alcohol use: No    Drug use: No    Sexual activity: Not Currently     Social Determinants of Health     Physical Activity: Inactive (9/3/2020)    Exercise Vital Sign     Days of Exercise per Week: 0 days     Minutes of Exercise per Session: 0 min   Stress: No Stress Concern Present (9/3/2020)    [employer] [place] [employer] - Occupational Stress Questionnaire     Feeling of Stress : Not at all   Social Connections: Moderately Integrated (9/3/2020)    Social Connection and Isolation Panel [NHANES]     Frequency of Communication with Friends and Family: More than three times a week     Frequency of Social Gatherings with Friends and Family: More than three times a week     Attends [employer] Services: 1 to 4 times per year     Active Member of Clubs or Organizations: Yes     Attends Club or Organization Meetings: 1 to 4 times per year     Marital Status:        Review of patient's allergies indicates:   Allergen Reactions    Adhesive     Niacin preparations     Penicillins     Phenergan [promethazine] Other (See Comments)     Could not wake up    Shellfish containing products     Ciprofloxacin Rash and Other (See Comments)     Pain at infusion site       Current Outpatient Medications:     A-C-E-zinc ox-selen AA-copper 1,000 unit-60 mg-30 unit Tab, Take 1 capsule by mouth., Disp: , Rfl:     alendronate (FOSAMAX) 70 MG tablet, , Disp: , Rfl:     ascorbic acid, vitamin C, (VITAMIN C) 1000 MG  tablet, Take 500 mg by mouth once daily. , Disp: , Rfl:     aspirin 81 MG Chew, Take 81 mg by mouth once daily., Disp: , Rfl:     clindamycin (CLEOCIN) 300 MG capsule, TAKE 1 CAPSULE BY MOUTH EVERY 8 HOURS FOR 10 DAYS, Disp: , Rfl:     coenzyme Q10 200 mg capsule, Take 200 mg by mouth once daily., Disp: , Rfl:     cyanocobalamin (VITAMIN B-12) 1000 MCG tablet, Take 100 mcg by mouth once daily., Disp: , Rfl:     cycloSPORINE (RESTASIS) 0.05 % ophthalmic emulsion, 1 drop 2 (two) times daily., Disp: , Rfl:     docusate sodium (COLACE) 100 MG capsule, Take 100 mg by mouth every evening., Disp: , Rfl:     exemestane (AROMASIN) 25 mg tablet, , Disp: , Rfl:     ezetimibe (ZETIA) 10 mg tablet, TAKE 1 TABLET BY MOUTH EVERY EVENING., Disp: 90 tablet, Rfl: 2    fenofibrate (TRICOR) 145 MG tablet, Take 145 mg by mouth once daily., Disp: , Rfl:     fluconazole (DIFLUCAN) 150 MG Tab, , Disp: , Rfl:     hydrALAZINE (APRESOLINE) 100 MG tablet, TAKE 1 TABLET (100 MG TOTAL) BY MOUTH EVERY 12 (TWELVE) HOURS., Disp: 180 tablet, Rfl: 1    HYDROcodone-acetaminophen (NORCO) 7.5-325 mg per tablet, Take 1 tablet by mouth every 6 (six) hours as needed for Pain., Disp: 20 tablet, Rfl: 0    lactobacillus acidophilus & bulgar (LACTINEX) 100 million cell packet, Take 1 tablet by mouth every evening. , Disp: , Rfl:     LYSINE HCL ORAL, Take by mouth., Disp: , Rfl:     magnesium oxide 400 mg magnesium Tab, Take 400 mg by mouth once daily. , Disp: , Rfl:     metformin (GLUCOPHAGE) 500 MG tablet, Take 500 mg by mouth daily with breakfast., Disp: , Rfl:     methocarbamoL (ROBAXIN) 500 MG Tab, Take 1 tablet (500 mg total) by mouth nightly as needed (breast contracture  muscle spasms)., Disp: 20 tablet, Rfl: 0    metoprolol succinate (TOPROL-XL) 25 MG 24 hr tablet, Take 25 mg by mouth every evening. , Disp: , Rfl:     montelukast (SINGULAIR) 10 mg tablet, Take 10 mg by mouth every evening., Disp: , Rfl:     omega-3 acid ethyl esters (LOVAZA) 1 gram  capsule, Take 1 capsule by mouth 2 (two) times daily., Disp: , Rfl: 5    pantoprazole (PROTONIX) 40 MG tablet, , Disp: , Rfl:     potassium chloride (KLOR-CON) 10 MEQ TbSR, Take 1 tablet (10 mEq total) by mouth every other day., Disp: 45 tablet, Rfl: 1    SYNTHROID 75 mcg tablet, 100 mcg., Disp: , Rfl:     TRUE METRIX GLUCOSE TEST STRIP Strp, USE TO CHECK BLOOD SUGAR DAILY, Disp: , Rfl:     vitamin D 1000 units Tab, Take 2,000 Units by mouth once daily., Disp: , Rfl:     vitamin E 100 UNIT capsule, Take 100 Units by mouth once daily., Disp: , Rfl:     XIGDUO XR 5-500 mg TBph, , Disp: , Rfl:     hydroCHLOROthiazide (HYDRODIURIL) 12.5 MG Tab, Take 1 tablet (12.5 mg total) by mouth every other day., Disp: 45 tablet, Rfl: 1    nitroGLYCERIN (NITROSTAT) 0.4 MG SL tablet, Place 1 tablet (0.4 mg total) under the tongue every 5 (five) minutes as needed., Disp: 25 tablet, Rfl: 0    olmesartan (BENICAR) 40 MG tablet, Take 1 tablet (40 mg total) by mouth once daily., Disp: 90 tablet, Rfl: 1    rosuvastatin (CRESTOR) 5 MG tablet, Take 1 tablet (5 mg total) by mouth once daily., Disp: 90 tablet, Rfl: 2    Review of Systems   Constitutional: Positive for weight loss (WITH TRULICITY). Negative for chills, diaphoresis, fever, malaise/fatigue, night sweats and weight gain.   HENT:  Negative for congestion and odynophagia.    Eyes:  Negative for blurred vision and visual disturbance.   Cardiovascular:  Negative for chest pain, claudication, cyanosis, dyspnea on exertion (MILD), irregular heartbeat, leg swelling, near-syncope, orthopnea, palpitations, paroxysmal nocturnal dyspnea and syncope.   Respiratory:  Negative for cough, hemoptysis and wheezing.    Endocrine: Negative.    Hematologic/Lymphatic: Negative for adenopathy. Does not bruise/bleed easily.   Skin:  Negative for color change and rash.   Musculoskeletal:  Negative for back pain and falls.   Gastrointestinal:  Negative for abdominal pain, change in bowel habit, dysphagia,  "jaundice, melena and nausea.   Genitourinary:  Negative for dysuria and flank pain.   Neurological:  Negative for focal weakness, light-headedness, loss of balance, tremors (OCC) and weakness.   Psychiatric/Behavioral:  Negative for altered mental status and depression.    Allergic/Immunologic: Negative for persistent infections.        Objective:      Vitals:    10/11/23 0945 10/11/23 1007   BP: (!) 142/90 138/86   Weight: 76.4 kg (168 lb 6.9 oz)    Height: 5' 5" (1.651 m)    PainSc: 0-No pain      Body mass index is 28.03 kg/m².    Physical Exam  Constitutional:       Appearance: She is overweight.   HENT:      Head: Normocephalic and atraumatic.   Eyes:      General: No scleral icterus.     Extraocular Movements: Extraocular movements intact.   Neck:      Vascular: Normal carotid pulses. No JVD.   Cardiovascular:      Rate and Rhythm: Regular rhythm. No extrasystoles are present.     Pulses:           Carotid pulses are 2+ on the right side and 2+ on the left side.       Radial pulses are 2+ on the right side and 2+ on the left side.        Posterior tibial pulses are 2+ on the right side and 2+ on the left side.      Heart sounds: Murmur heard.      Systolic murmur is present with a grade of 2/6 at the lower left sternal border.      No friction rub. No S4 sounds.   Pulmonary:      Effort: Pulmonary effort is normal.      Breath sounds: Normal breath sounds and air entry. No rales.   Abdominal:      Palpations: Abdomen is soft.      Tenderness: There is no abdominal tenderness.   Musculoskeletal:      Cervical back: Neck supple.      Right lower leg: No edema.      Left lower leg: No edema.   Skin:     General: Skin is warm and dry.      Capillary Refill: Capillary refill takes less than 2 seconds.   Neurological:      General: No focal deficit present.      Mental Status: She is alert and oriented to person, place, and time.      Cranial Nerves: Cranial nerves 2-12 are intact.   Psychiatric:         Mood and " Affect: Mood normal.         Speech: Speech normal.         Behavior: Behavior normal.                 ..    Chemistry        Component Value Date/Time     07/26/2023 0449     10/20/2021 0820    K 3.5 (L) 07/26/2023 0449    K 4.6 10/20/2021 0820     10/20/2021 0820    CO2 23 07/26/2023 0449    CO2 24 10/20/2021 0820    BUN 10 07/26/2023 0449    BUN 13 10/20/2021 0820    CREATININE 0.69 07/26/2023 0449    CREATININE 0.8 10/20/2021 0820     (H) 10/20/2021 0820        Component Value Date/Time    CALCIUM 8.4 (L) 07/26/2023 0449    CALCIUM 8.9 10/20/2021 0820    ALKPHOS 47 07/26/2023 0449    ALKPHOS 43 (L) 10/20/2021 0820    AST 17 07/26/2023 0449    AST 18 10/20/2021 0820    ALT 10 07/26/2023 0449    ALT 13 10/20/2021 0820    BILITOT 0.6 07/26/2023 0449    BILITOT 0.7 10/20/2021 0820    ESTGFRAFRICA >60.0 10/20/2021 0820    EGFRNONAA >60.0 10/20/2021 0820            ..  Lab Results   Component Value Date    CHOL 111 (L) 11/18/2021    CHOL 121 05/04/2021    CHOL 127 07/02/2020     Lab Results   Component Value Date    HDL 45 11/18/2021    HDL 40 05/04/2021    HDL 48 07/02/2020     Lab Results   Component Value Date    LDLCALC 51.4 (L) 11/18/2021    LDLCALC 52.8 (L) 05/04/2021    LDLCALC 54.6 (L) 07/02/2020     Lab Results   Component Value Date    TRIG 73 11/18/2021    TRIG 141 05/04/2021    TRIG 122 07/02/2020     Lab Results   Component Value Date    CHOLHDL 40.5 11/18/2021    CHOLHDL 33.1 05/04/2021    CHOLHDL 37.8 07/02/2020     ..  Lab Results   Component Value Date    WBC 9.22 10/20/2021    HGB 15.2 10/20/2021    HCT 45.7 10/20/2021    MCV 93 10/20/2021     10/20/2021       Test(s) Reviewed  I have reviewed the following in detail:  [] Stress test   [] Angiography   [] Echocardiogram   [x] Labs   [] Other:       Assessment:         ICD-10-CM ICD-9-CM   1. Coronary artery disease of native artery of native heart with stable angina pectoris  I25.118 414.01     413.9   2. Non-rheumatic  mitral regurgitation  I34.0 424.0   3. Hypertension associated with diabetes  E11.59 250.80    I15.2 401.9   4. Carotid artery plaque, bilateral  I65.23 433.10     433.30   5. Hypercholesterolemia  E78.00 272.0   6. Overweight (BMI 25.0-29.9)  E66.3 278.02     Problem List Items Addressed This Visit          Cardiac/Vascular    Essential (primary) hypertension    Coronary artery disease of native artery of native heart with stable angina pectoris - Primary    Relevant Orders    Comprehensive Metabolic Panel    Lipid Panel    CBC Auto Differential    Non-rheumatic mitral regurgitation    Hypercholesterolemia    Relevant Orders    Comprehensive Metabolic Panel    Lipid Panel    Carotid artery plaque, bilateral       Endocrine    Overweight (BMI 25.0-29.9)        Plan:     CHANGE LOSARTAN TO BENICAR 40, MG AND WATCH BLOOD PRESSURE,ALL CV CLINICALLY STABLE, CLASS 1 ANGINA, NO HF, NO TIA, NO CLINICAL ARRHYTHMIA,CONTINUE CURRENT MEDS, EDUCATION, DIET, EXERCISE , RETURN TO CLINIC IN 6 MO WITH LABS      Coronary artery disease of native artery of native heart with stable angina pectoris  -     Comprehensive Metabolic Panel; Future; Expected date: 04/11/2024  -     Lipid Panel; Future; Expected date: 04/11/2024  -     CBC Auto Differential; Future; Expected date: 04/11/2024    Non-rheumatic mitral regurgitation    Hypertension associated with diabetes  -     Comprehensive Metabolic Panel; Future; Expected date: 04/11/2024    Carotid artery plaque, bilateral    Hypercholesterolemia  -     Comprehensive Metabolic Panel; Future; Expected date: 04/11/2024  -     Lipid Panel; Future; Expected date: 04/11/2024    Overweight (BMI 25.0-29.9)    Other orders  -     olmesartan (BENICAR) 40 MG tablet; Take 1 tablet (40 mg total) by mouth once daily.  Dispense: 90 tablet; Refill: 1    RTC Low level/low impact aerobic exercise 5x's/wk. Heart healthy diet and risk factor modification.    See labs and med orders.    Aerobic exercise  5x's/wk. Heart healthy diet and risk factor modification.    See labs and med orders.

## 2023-10-24 ENCOUNTER — TELEPHONE (OUTPATIENT)
Dept: CARDIOLOGY | Facility: CLINIC | Age: 78
End: 2023-10-24
Payer: MEDICARE

## 2023-11-27 RX ORDER — HYDRALAZINE HYDROCHLORIDE 100 MG/1
100 TABLET, FILM COATED ORAL EVERY 12 HOURS
Qty: 180 TABLET | Refills: 1 | Status: SHIPPED | OUTPATIENT
Start: 2023-11-27 | End: 2024-11-26

## 2023-11-30 ENCOUNTER — TELEPHONE (OUTPATIENT)
Dept: CARDIOLOGY | Facility: CLINIC | Age: 78
End: 2023-11-30
Payer: MEDICARE

## 2023-11-30 NOTE — TELEPHONE ENCOUNTER
----- Message from Darlene Blevins sent at 11/30/2023 12:54 PM CST -----  Regarding: Sooner Appointment Request  Contact: patient at [phone]  Type:  Sooner Appointment Request    Name of Caller:  patient at [phone]    Additional Information:  would like a follow up appointment after the 12th of December, which will be her last radiation treatment. Please call and advise. Thank you

## 2023-11-30 NOTE — TELEPHONE ENCOUNTER
----- Message from Andrzej Syl sent at 11/30/2023  3:21 PM CST -----  Regarding: Return Call  Patient Returning Call      Who Called: Pt    Who Left Message for Patient: Barby    Does the patient know what this is regarding?: Yes    Would the patient rather a call back or a response via [employer]? Call back    Best Call Back Number: [phone]      Additional Information:  Please Advise - Thank you

## 2023-12-12 ENCOUNTER — LAB VISIT (OUTPATIENT)
Dept: LAB | Facility: HOSPITAL | Age: 78
End: 2023-12-12
Attending: INTERNAL MEDICINE
Payer: MEDICARE

## 2023-12-12 ENCOUNTER — OFFICE VISIT (OUTPATIENT)
Dept: CARDIOLOGY | Facility: CLINIC | Age: 78
End: 2023-12-12
Payer: MEDICARE

## 2023-12-12 VITALS
HEART RATE: 117 BPM | BODY MASS INDEX: 26.96 KG/M2 | HEIGHT: 65 IN | WEIGHT: 161.81 LBS | DIASTOLIC BLOOD PRESSURE: 78 MMHG | SYSTOLIC BLOOD PRESSURE: 113 MMHG

## 2023-12-12 DIAGNOSIS — I25.118 CORONARY ARTERY DISEASE OF NATIVE ARTERY OF NATIVE HEART WITH STABLE ANGINA PECTORIS: ICD-10-CM

## 2023-12-12 DIAGNOSIS — I34.0 NON-RHEUMATIC MITRAL REGURGITATION: Chronic | ICD-10-CM

## 2023-12-12 DIAGNOSIS — C34.31 MALIGNANT NEOPLASM OF LOWER LOBE OF RIGHT LUNG: Chronic | ICD-10-CM

## 2023-12-12 DIAGNOSIS — R53.83 FATIGUE, UNSPECIFIED TYPE: ICD-10-CM

## 2023-12-12 DIAGNOSIS — I25.118 CORONARY ARTERY DISEASE OF NATIVE ARTERY OF NATIVE HEART WITH STABLE ANGINA PECTORIS: Chronic | ICD-10-CM

## 2023-12-12 DIAGNOSIS — R06.02 SOB (SHORTNESS OF BREATH): ICD-10-CM

## 2023-12-12 DIAGNOSIS — R06.02 SOB (SHORTNESS OF BREATH): Primary | ICD-10-CM

## 2023-12-12 PROBLEM — E66.9 OBESITY, CLASS I, BMI 30-34.9: Status: RESOLVED | Noted: 2021-03-17 | Resolved: 2023-12-12

## 2023-12-12 PROBLEM — E66.811 OBESITY, CLASS I, BMI 30-34.9: Status: RESOLVED | Noted: 2021-03-17 | Resolved: 2023-12-12

## 2023-12-12 LAB
ALBUMIN SERPL BCP-MCNC: 3.5 G/DL (ref 3.5–5.2)
ALP SERPL-CCNC: 56 U/L (ref 55–135)
ALT SERPL W/O P-5'-P-CCNC: 17 U/L (ref 10–44)
ANION GAP SERPL CALC-SCNC: 10 MMOL/L (ref 8–16)
AST SERPL-CCNC: 20 U/L (ref 10–40)
BASOPHILS # BLD AUTO: 0.04 K/UL (ref 0–0.2)
BASOPHILS NFR BLD: 0.5 % (ref 0–1.9)
BILIRUB SERPL-MCNC: 0.5 MG/DL (ref 0.1–1)
BNP SERPL-MCNC: <10 PG/ML (ref 0–99)
BUN SERPL-MCNC: 10 MG/DL (ref 8–23)
CALCIUM SERPL-MCNC: 9.4 MG/DL (ref 8.7–10.5)
CHLORIDE SERPL-SCNC: 107 MMOL/L (ref 95–110)
CO2 SERPL-SCNC: 23 MMOL/L (ref 23–29)
CREAT SERPL-MCNC: 1 MG/DL (ref 0.5–1.4)
DIFFERENTIAL METHOD: ABNORMAL
EOSINOPHIL # BLD AUTO: 0.4 K/UL (ref 0–0.5)
EOSINOPHIL NFR BLD: 5.6 % (ref 0–8)
ERYTHROCYTE [DISTWIDTH] IN BLOOD BY AUTOMATED COUNT: 12.7 % (ref 11.5–14.5)
EST. GFR  (NO RACE VARIABLE): 58 ML/MIN/1.73 M^2
GLUCOSE SERPL-MCNC: 119 MG/DL (ref 70–110)
HCT VFR BLD AUTO: 41.1 % (ref 37–48.5)
HGB BLD-MCNC: 13.7 G/DL (ref 12–16)
IMM GRANULOCYTES # BLD AUTO: 0.02 K/UL (ref 0–0.04)
IMM GRANULOCYTES NFR BLD AUTO: 0.3 % (ref 0–0.5)
LYMPHOCYTES # BLD AUTO: 0.7 K/UL (ref 1–4.8)
LYMPHOCYTES NFR BLD: 8.5 % (ref 18–48)
MCH RBC QN AUTO: 30.2 PG (ref 27–31)
MCHC RBC AUTO-ENTMCNC: 33.3 G/DL (ref 32–36)
MCV RBC AUTO: 91 FL (ref 82–98)
MONOCYTES # BLD AUTO: 1.1 K/UL (ref 0.3–1)
MONOCYTES NFR BLD: 14.9 % (ref 4–15)
NEUTROPHILS # BLD AUTO: 5.4 K/UL (ref 1.8–7.7)
NEUTROPHILS NFR BLD: 70.2 % (ref 38–73)
NRBC BLD-RTO: 0 /100 WBC
PLATELET # BLD AUTO: 260 K/UL (ref 150–450)
PMV BLD AUTO: 10.5 FL (ref 9.2–12.9)
POTASSIUM SERPL-SCNC: 3.9 MMOL/L (ref 3.5–5.1)
PROT SERPL-MCNC: 7.3 G/DL (ref 6–8.4)
RBC # BLD AUTO: 4.54 M/UL (ref 4–5.4)
SODIUM SERPL-SCNC: 140 MMOL/L (ref 136–145)
TSH SERPL DL<=0.005 MIU/L-ACNC: 0.09 UIU/ML (ref 0.4–4)
WBC # BLD AUTO: 7.64 K/UL (ref 3.9–12.7)

## 2023-12-12 PROCEDURE — 1126F PR PAIN SEVERITY QUANTIFIED, NO PAIN PRESENT: ICD-10-PCS | Mod: CPTII,S$GLB,, | Performed by: INTERNAL MEDICINE

## 2023-12-12 PROCEDURE — 3074F PR MOST RECENT SYSTOLIC BLOOD PRESSURE < 130 MM HG: ICD-10-PCS | Mod: CPTII,S$GLB,, | Performed by: INTERNAL MEDICINE

## 2023-12-12 PROCEDURE — 83880 ASSAY OF NATRIURETIC PEPTIDE: CPT | Performed by: INTERNAL MEDICINE

## 2023-12-12 PROCEDURE — 3288F PR FALLS RISK ASSESSMENT DOCUMENTED: ICD-10-PCS | Mod: CPTII,S$GLB,, | Performed by: INTERNAL MEDICINE

## 2023-12-12 PROCEDURE — 3078F PR MOST RECENT DIASTOLIC BLOOD PRESSURE < 80 MM HG: ICD-10-PCS | Mod: CPTII,S$GLB,, | Performed by: INTERNAL MEDICINE

## 2023-12-12 PROCEDURE — 99999 PR PBB SHADOW E&M-EST. PATIENT-LVL IV: CPT | Mod: PBBFAC,,, | Performed by: INTERNAL MEDICINE

## 2023-12-12 PROCEDURE — 99214 PR OFFICE/OUTPT VISIT, EST, LEVL IV, 30-39 MIN: ICD-10-PCS | Mod: S$GLB,,, | Performed by: INTERNAL MEDICINE

## 2023-12-12 PROCEDURE — 1159F PR MEDICATION LIST DOCUMENTED IN MEDICAL RECORD: ICD-10-PCS | Mod: CPTII,S$GLB,, | Performed by: INTERNAL MEDICINE

## 2023-12-12 PROCEDURE — 85025 COMPLETE CBC W/AUTO DIFF WBC: CPT | Performed by: INTERNAL MEDICINE

## 2023-12-12 PROCEDURE — 1101F PT FALLS ASSESS-DOCD LE1/YR: CPT | Mod: CPTII,S$GLB,, | Performed by: INTERNAL MEDICINE

## 2023-12-12 PROCEDURE — 3074F SYST BP LT 130 MM HG: CPT | Mod: CPTII,S$GLB,, | Performed by: INTERNAL MEDICINE

## 2023-12-12 PROCEDURE — 84439 ASSAY OF FREE THYROXINE: CPT | Performed by: INTERNAL MEDICINE

## 2023-12-12 PROCEDURE — 84443 ASSAY THYROID STIM HORMONE: CPT | Performed by: INTERNAL MEDICINE

## 2023-12-12 PROCEDURE — 99999 PR PBB SHADOW E&M-EST. PATIENT-LVL IV: ICD-10-PCS | Mod: PBBFAC,,, | Performed by: INTERNAL MEDICINE

## 2023-12-12 PROCEDURE — 3078F DIAST BP <80 MM HG: CPT | Mod: CPTII,S$GLB,, | Performed by: INTERNAL MEDICINE

## 2023-12-12 PROCEDURE — 1101F PR PT FALLS ASSESS DOC 0-1 FALLS W/OUT INJ PAST YR: ICD-10-PCS | Mod: CPTII,S$GLB,, | Performed by: INTERNAL MEDICINE

## 2023-12-12 PROCEDURE — 1126F AMNT PAIN NOTED NONE PRSNT: CPT | Mod: CPTII,S$GLB,, | Performed by: INTERNAL MEDICINE

## 2023-12-12 PROCEDURE — 99214 OFFICE O/P EST MOD 30 MIN: CPT | Mod: S$GLB,,, | Performed by: INTERNAL MEDICINE

## 2023-12-12 PROCEDURE — 36415 COLL VENOUS BLD VENIPUNCTURE: CPT | Mod: PO | Performed by: INTERNAL MEDICINE

## 2023-12-12 PROCEDURE — 1159F MED LIST DOCD IN RCRD: CPT | Mod: CPTII,S$GLB,, | Performed by: INTERNAL MEDICINE

## 2023-12-12 PROCEDURE — 3288F FALL RISK ASSESSMENT DOCD: CPT | Mod: CPTII,S$GLB,, | Performed by: INTERNAL MEDICINE

## 2023-12-12 PROCEDURE — 80053 COMPREHEN METABOLIC PANEL: CPT | Performed by: INTERNAL MEDICINE

## 2023-12-12 RX ORDER — DULAGLUTIDE 1.5 MG/.5ML
INJECTION, SOLUTION SUBCUTANEOUS
COMMUNITY

## 2023-12-12 NOTE — PROGRESS NOTES
Subjective:    Patient ID:  Cat Meza is a 77 y.o. female who presents for Coronary artery disease of native artery of native heart wi        HPI  EARLIER OFFICE VISIT, SOB FOR OVER A MONTH, SAW PCP, WENT TO ER AT [facility] [place], NO PE ?? X RAY, ALSO UPPER  BACK DISCOFORT, HAS BEEN TIRED SINCE RADIATION, 20 TREATMENT LAST TREATMENT WAS TODAY, RADIATION WAS 2 MID CHEST---AND R LUNG, DR STEIN, HALEY MCLAIN, ALL THESE SX STARTED AFTER 5TH TX DOSE OF RADIATION AND HAS BEEN PROGRESSIVELY WORSE,, , SEE ROS    Past Medical History:   Diagnosis Date    Acute coronary syndrome     LIGHT HEART ATTACK YEARS AGO ??    Bowel obstruction 2018    Cancer     breast    Cancer     lung    COPD (chronic obstructive pulmonary disease)     Coronary artery disease     Diabetes mellitus     Edema     Heart murmur     Hyperlipidemia     Hypertension     Thyroid disease     hypothyroid    Valvular regurgitation      Past Surgical History:   Procedure Laterality Date    BOWEL RESECTION  05/2018    BREAST SURGERY  2011    breast reconstruction    BREAST SURGERY  05/07/2018    implant exchange    CARDIAC CATHETERIZATION      CHOLECYSTECTOMY  5/31/2018    Procedure: CHOLECYSTECTOMY;  Surgeon: Zev Durán MD;  Location: [facility] OR;  Service: General;;  opened at 1010      COLON SURGERY      gall stones      HYSTERECTOMY      INCISION OF INTESTINE N/A 5/31/2018    Procedure: ENTEROTOMY- REPAIR;  Surgeon: Zev Durán MD;  Location: [facility] OR;  Service: General;  Laterality: N/A;    LAPAROSCOPIC CHOLECYSTECTOMY N/A 5/31/2018    Procedure: CHOLECYSTECTOMY, LAPAROSCOPIC- ATTEMPTED;  Surgeon: Zev Durán MD;  Location: [facility] OR;  Service: General;  Laterality: N/A;  attempted    LIPOMA RESECTION Right 5/31/2018    Procedure: EXCISION, LIPOMA;  Surgeon: Zev Durán MD;  Location: [facility] OR;  Service: General;  Laterality: Right;    LYSIS OF ADHESIONS  6/2/2018    Procedure: LYSIS, ADHESIONS;  Surgeon: Zev Durán MD;  Location: [facility] OR;  Service:  General;;    MASTECTOMY Bilateral      with breast implants bilat     Family History   Problem Relation Age of Onset    Heart disease Mother     Cancer Father      Social History     Socioeconomic History    Marital status:    Tobacco Use    Smoking status: Former     Current packs/day: 0.00     Types: Cigarettes     Quit date: 2002     Years since quittin.3    Smokeless tobacco: Never   Substance and Sexual Activity    Alcohol use: No    Drug use: No    Sexual activity: Not Currently     Social Determinants of Health     Physical Activity: Inactive (9/3/2020)    Exercise Vital Sign     Days of Exercise per Week: 0 days     Minutes of Exercise per Session: 0 min   Stress: No Stress Concern Present (9/3/2020)    [employer] - Occupational Stress Questionnaire     Feeling of Stress : Not at all   Social Connections: Moderately Integrated (9/3/2020)    Social Connection and Isolation Panel [NHANES]     Frequency of Communication with Friends and Family: More than three times a week     Frequency of Social Gatherings with Friends and Family: More than three times a week     Attends Buddhism Services: 1 to 4 times per year     Active Member of Clubs or Organizations: Yes     Attends Club or Organization Meetings: 1 to 4 times per year     Marital Status:        Review of patient's allergies indicates:   Allergen Reactions    Adhesive     Niacin Hives    Niacin preparations     Penicillins     Phenergan [promethazine] Other (See Comments)     Could not wake up    Shellfish containing products     Ciprofloxacin Rash and Other (See Comments)     Pain at infusion site       Current Outpatient Medications:     A-C-E-zinc ox-selen AA-copper 1,000 unit-60 mg-30 unit Tab, Take 1 capsule by mouth., Disp: , Rfl:     alendronate (FOSAMAX) 70 MG tablet, , Disp: , Rfl:     ascorbic acid, vitamin C, (VITAMIN C) 1000 MG tablet, Take 500 mg by mouth once daily. , Disp: , Rfl:      aspirin 81 MG Chew, Take 81 mg by mouth once daily., Disp: , Rfl:     clindamycin (CLEOCIN) 300 MG capsule, TAKE 1 CAPSULE BY MOUTH EVERY 8 HOURS FOR 10 DAYS, Disp: , Rfl:     coenzyme Q10 200 mg capsule, Take 200 mg by mouth once daily., Disp: , Rfl:     cyanocobalamin (VITAMIN B-12) 1000 MCG tablet, Take 100 mcg by mouth once daily., Disp: , Rfl:     cycloSPORINE (RESTASIS) 0.05 % ophthalmic emulsion, 1 drop 2 (two) times daily., Disp: , Rfl:     docusate sodium (COLACE) 100 MG capsule, Take 100 mg by mouth every evening., Disp: , Rfl:     dulaglutide (TRULICITY) 1.5 mg/0.5 mL pen injector, Inject into the skin every 7 days., Disp: , Rfl:     exemestane (AROMASIN) 25 mg tablet, , Disp: , Rfl:     ezetimibe (ZETIA) 10 mg tablet, TAKE 1 TABLET BY MOUTH EVERY EVENING., Disp: 90 tablet, Rfl: 2    fenofibrate (TRICOR) 145 MG tablet, Take 145 mg by mouth once daily., Disp: , Rfl:     fluconazole (DIFLUCAN) 150 MG Tab, , Disp: , Rfl:     hydrALAZINE (APRESOLINE) 100 MG tablet, TAKE 1 TABLET (100 MG TOTAL) BY MOUTH EVERY 12 (TWELVE) HOURS., Disp: 180 tablet, Rfl: 1    HYDROcodone-acetaminophen (NORCO) 7.5-325 mg per tablet, Take 1 tablet by mouth every 6 (six) hours as needed for Pain., Disp: 20 tablet, Rfl: 0    lactobacillus acidophilus & bulgar (LACTINEX) 100 million cell packet, Take 1 tablet by mouth every evening. , Disp: , Rfl:     LYSINE HCL ORAL, Take by mouth., Disp: , Rfl:     magnesium oxide 400 mg magnesium Tab, Take 400 mg by mouth once daily. , Disp: , Rfl:     metformin (GLUCOPHAGE) 500 MG tablet, Take 500 mg by mouth daily with breakfast., Disp: , Rfl:     methocarbamoL (ROBAXIN) 500 MG Tab, Take 1 tablet (500 mg total) by mouth nightly as needed (breast contracture  muscle spasms)., Disp: 20 tablet, Rfl: 0    metoprolol succinate (TOPROL-XL) 25 MG 24 hr tablet, Take 25 mg by mouth every evening. , Disp: , Rfl:     montelukast (SINGULAIR) 10 mg tablet, Take 10 mg by mouth every evening., Disp: , Rfl:      olmesartan (BENICAR) 40 MG tablet, Take 1 tablet (40 mg total) by mouth once daily., Disp: 90 tablet, Rfl: 1    omega-3 acid ethyl esters (LOVAZA) 1 gram capsule, Take 1 capsule by mouth 2 (two) times daily., Disp: , Rfl: 5    pantoprazole (PROTONIX) 40 MG tablet, , Disp: , Rfl:     potassium chloride (KLOR-CON) 10 MEQ TbSR, Take 1 tablet (10 mEq total) by mouth every other day., Disp: 45 tablet, Rfl: 1    SYNTHROID 75 mcg tablet, 100 mcg., Disp: , Rfl:     TRUE METRIX GLUCOSE TEST STRIP Strp, USE TO CHECK BLOOD SUGAR DAILY, Disp: , Rfl:     vitamin D 1000 units Tab, Take 2,000 Units by mouth once daily., Disp: , Rfl:     vitamin E 100 UNIT capsule, Take 100 Units by mouth once daily., Disp: , Rfl:     XIGDUO XR 5-500 mg TBph, , Disp: , Rfl:     hydroCHLOROthiazide (HYDRODIURIL) 12.5 MG Tab, Take 1 tablet (12.5 mg total) by mouth every other day., Disp: 45 tablet, Rfl: 1    nitroGLYCERIN (NITROSTAT) 0.4 MG SL tablet, Place 1 tablet (0.4 mg total) under the tongue every 5 (five) minutes as needed., Disp: 25 tablet, Rfl: 0    rosuvastatin (CRESTOR) 5 MG tablet, Take 1 tablet (5 mg total) by mouth once daily., Disp: 90 tablet, Rfl: 2    Review of Systems   Constitutional: Positive for malaise/fatigue and weight loss. Negative for chills, diaphoresis, fever and night sweats.   HENT:  Negative for congestion and odynophagia.    Eyes:  Negative for blurred vision and visual disturbance.   Cardiovascular:  Positive for dyspnea on exertion and palpitations. Negative for chest pain, claudication, cyanosis, irregular heartbeat, leg swelling, near-syncope, orthopnea, paroxysmal nocturnal dyspnea and syncope.   Respiratory:  Positive for shortness of breath. Negative for cough, hemoptysis and wheezing.    Endocrine: Negative.    Hematologic/Lymphatic: Negative for adenopathy. Does not bruise/bleed easily.   Skin:  Negative for color change and rash.   Musculoskeletal:  Negative for back pain and falls.   Gastrointestinal:   "Negative for abdominal pain, change in bowel habit, dysphagia, jaundice, melena and nausea.   Genitourinary:  Negative for dysuria and flank pain.   Neurological:  Negative for focal weakness, light-headedness, loss of balance, tremors (OCC) and weakness.   Psychiatric/Behavioral:  Negative for altered mental status and depression.    Allergic/Immunologic: Negative for persistent infections.        Objective:      Vitals:    12/12/23 1525   BP: 113/78   Pulse: (!) 117   Weight: 73.4 kg (161 lb 13.1 oz)   Height: 5' 5" (1.651 m)   PainSc: 0-No pain     Body mass index is 26.93 kg/m².    Physical Exam  HENT:      Head: Normocephalic and atraumatic.   Eyes:      Extraocular Movements: Extraocular movements intact.      Conjunctiva/sclera: Conjunctivae normal.   Neck:      Vascular: Normal carotid pulses. No hepatojugular reflux or JVD.   Cardiovascular:      Rate and Rhythm: Regular rhythm. No extrasystoles are present.     Pulses:           Carotid pulses are 2+ on the right side and 2+ on the left side.       Radial pulses are 2+ on the right side and 2+ on the left side.        Posterior tibial pulses are 2+ on the right side and 2+ on the left side.      Heart sounds: Murmur heard.      Systolic murmur is present with a grade of 1/6 at the lower left sternal border and apex.      No friction rub. No S4 sounds.   Pulmonary:      Effort: Pulmonary effort is normal.      Breath sounds: Normal breath sounds. No rales.   Abdominal:      Palpations: Abdomen is soft.      Tenderness: There is no abdominal tenderness.   Musculoskeletal:      Cervical back: Neck supple.      Right lower leg: No edema.      Left lower leg: No edema.   Skin:     General: Skin is warm and dry.      Capillary Refill: Capillary refill takes less than 2 seconds.   Neurological:      General: No focal deficit present.      Mental Status: She is oriented to person, place, and time.      Cranial Nerves: Cranial nerves 2-12 are intact.   Psychiatric: "         Mood and Affect: Mood normal.         Speech: Speech normal.         Behavior: Behavior normal.                 ..    Chemistry        Component Value Date/Time     12/12/2023 1630    K 3.9 12/12/2023 1630     12/12/2023 1630    CO2 23 12/12/2023 1630    BUN 10 12/12/2023 1630    CREATININE 1.0 12/12/2023 1630     (H) 12/12/2023 1630        Component Value Date/Time    CALCIUM 9.4 12/12/2023 1630    ALKPHOS 56 12/12/2023 1630    AST 20 12/12/2023 1630    ALT 17 12/12/2023 1630    BILITOT 0.5 12/12/2023 1630    ESTGFRAFRICA >60.0 10/20/2021 0820    EGFRNONAA >60.0 10/20/2021 0820            ..  Lab Results   Component Value Date    CHOL 111 (L) 11/18/2021    CHOL 121 05/04/2021    CHOL 127 07/02/2020     Lab Results   Component Value Date    HDL 45 11/18/2021    HDL 40 05/04/2021    HDL 48 07/02/2020     Lab Results   Component Value Date    LDLCALC 51.4 (L) 11/18/2021    LDLCALC 52.8 (L) 05/04/2021    LDLCALC 54.6 (L) 07/02/2020     Lab Results   Component Value Date    TRIG 73 11/18/2021    TRIG 141 05/04/2021    TRIG 122 07/02/2020     Lab Results   Component Value Date    CHOLHDL 40.5 11/18/2021    CHOLHDL 33.1 05/04/2021    CHOLHDL 37.8 07/02/2020     ..  Lab Results   Component Value Date    WBC 7.64 12/12/2023    HGB 13.7 12/12/2023    HCT 41.1 12/12/2023    MCV 91 12/12/2023     12/12/2023       Test(s) Reviewed  I have reviewed the following in detail:  [] Stress test   [] Angiography   [] Echocardiogram   [] Labs   [x] Other:       Assessment:         ICD-10-CM ICD-9-CM   1. SOB (shortness of breath)  R06.02 786.05   2. Fatigue, unspecified type  R53.83 780.79   3. Malignant neoplasm of lower lobe of right lung  C34.31 162.5   4. Non-rheumatic mitral regurgitation  I34.0 424.0   5. Coronary artery disease of native artery of native heart with stable angina pectoris  I25.118 414.01     413.9     Problem List Items Addressed This Visit          Pulmonary    SOB (shortness of  breath) - Primary    Relevant Orders    Echo    Comprehensive Metabolic Panel (Completed)    CBC Auto Differential (Completed)    TSH (Completed)    BNP (Completed)    CT Chest Without Contrast       Cardiac/Vascular    Coronary artery disease of native artery of native heart with stable angina pectoris    Relevant Orders    Echo    Comprehensive Metabolic Panel (Completed)    Non-rheumatic mitral regurgitation    Relevant Orders    Echo       Oncology    Malignant neoplasm of lower lobe of right lung    Relevant Orders    CT Chest Without Contrast       Other    Fatigue    Relevant Orders    TSH (Completed)        Plan:     EKG ST, DOUBT ANY CARDIAC ETIOLOGY FOR SYMPTOMS CHECK BNP, LABS, ECHO, AND CHEST CT IN THIS PATIENT WITH SQUAMOUS CELL CARCINOMA OF THE RIGHT LUNG SYMPTOMS CORRELATED WITH RADIATION, FOLLOW-UP WITH PCP AND ONCOLOGY, PATIENT AND FAMILY UNDERSTAND       SOB (shortness of breath)  -     Echo  -     Comprehensive Metabolic Panel; Future; Expected date: 12/12/2023  -     CBC Auto Differential; Future; Expected date: 12/12/2023  -     TSH; Future; Expected date: 12/12/2023  -     BNP; Future; Expected date: 12/12/2023  -     CT Chest Without Contrast; Future; Expected date: 12/12/2023    Fatigue, unspecified type  -     TSH; Future; Expected date: 12/12/2023    Malignant neoplasm of lower lobe of right lung  Comments:  SQUAMOUS  Orders:  -     CT Chest Without Contrast; Future; Expected date: 12/12/2023    Non-rheumatic mitral regurgitation  -     Echo    Coronary artery disease of native artery of native heart with stable angina pectoris  -     Echo  -     Comprehensive Metabolic Panel; Future; Expected date: 12/12/2023    RTC Low level/low impact aerobic exercise 5x's/wk. Heart healthy diet and risk factor modification.    See labs and med orders.    Aerobic exercise 5x's/wk. Heart healthy diet and risk factor modification.    See labs and med orders.

## 2023-12-13 LAB — T4 FREE SERPL-MCNC: 1.27 NG/DL (ref 0.71–1.51)

## 2023-12-14 ENCOUNTER — CLINICAL SUPPORT (OUTPATIENT)
Dept: CARDIOLOGY | Facility: HOSPITAL | Age: 78
End: 2023-12-14
Attending: INTERNAL MEDICINE
Payer: MEDICARE

## 2023-12-14 VITALS — WEIGHT: 161 LBS | HEIGHT: 65 IN | BODY MASS INDEX: 26.82 KG/M2

## 2023-12-14 PROCEDURE — 93306 TTE W/DOPPLER COMPLETE: CPT | Mod: PO

## 2023-12-14 PROCEDURE — 93306 TTE W/DOPPLER COMPLETE: CPT | Mod: 26,,, | Performed by: INTERNAL MEDICINE

## 2023-12-14 PROCEDURE — 93306 ECHO (CUPID ONLY): ICD-10-PCS | Mod: 26,,, | Performed by: INTERNAL MEDICINE

## 2023-12-15 LAB
ASCENDING AORTA: 3.25 CM
AV INDEX (PROSTH): 0.71
AV MEAN GRADIENT: 4 MMHG
AV PEAK GRADIENT: 8 MMHG
AV VALVE AREA BY VELOCITY RATIO: 2.43 CM²
AV VALVE AREA: 2.44 CM²
AV VELOCITY RATIO: 0.71
BSA FOR ECHO PROCEDURE: 1.83 M2
CV ECHO LV RWT: 0.45 CM
DOP CALC AO PEAK VEL: 1.44 M/S
DOP CALC AO VTI: 25.6 CM
DOP CALC LVOT AREA: 3.4 CM2
DOP CALC LVOT DIAMETER: 2.09 CM
DOP CALC LVOT PEAK VEL: 1.02 M/S
DOP CALC LVOT STROKE VOLUME: 62.41 CM3
DOP CALCLVOT PEAK VEL VTI: 18.2 CM
E WAVE DECELERATION TIME: 278.14 MSEC
E/A RATIO: 0.68
E/E' RATIO: 10.17 M/S
ECHO LV POSTERIOR WALL: 0.81 CM (ref 0.6–1.1)
EJECTION FRACTION: 70 %
FRACTIONAL SHORTENING: 38 % (ref 28–44)
INTERVENTRICULAR SEPTUM: 0.81 CM (ref 0.6–1.1)
IVRT: 128.45 MSEC
LEFT ATRIUM SIZE: 2.89 CM
LEFT ATRIUM VOLUME INDEX MOD: 12.7 ML/M2
LEFT ATRIUM VOLUME MOD: 22.81 CM3
LEFT INTERNAL DIMENSION IN SYSTOLE: 2.23 CM (ref 2.1–4)
LEFT VENTRICLE DIASTOLIC VOLUME INDEX: 30.69 ML/M2
LEFT VENTRICLE DIASTOLIC VOLUME: 55.24 ML
LEFT VENTRICLE MASS INDEX: 45 G/M2
LEFT VENTRICLE SYSTOLIC VOLUME INDEX: 9.3 ML/M2
LEFT VENTRICLE SYSTOLIC VOLUME: 16.74 ML
LEFT VENTRICULAR INTERNAL DIMENSION IN DIASTOLE: 3.62 CM (ref 3.5–6)
LEFT VENTRICULAR MASS: 80.84 G
LV LATERAL E/E' RATIO: 10.17 M/S
LV SEPTAL E/E' RATIO: 10.17 M/S
LVOT MG: 2.42 MMHG
LVOT MV: 0.74 CM/S
MV PEAK A VEL: 0.9 M/S
MV PEAK E VEL: 0.61 M/S
MV STENOSIS PRESSURE HALF TIME: 80.66 MS
MV VALVE AREA P 1/2 METHOD: 2.73 CM2
PISA TR MAX VEL: 2 M/S
PULM VEIN S/D RATIO: 1.3
PV PEAK D VEL: 0.92 M/S
PV PEAK S VEL: 1.2 M/S
RA PRESSURE ESTIMATED: 3 MMHG
RA VOL SYS: 16.14 ML
RIGHT ATRIAL AREA: 7.7 CM2
RIGHT VENTRICULAR END-DIASTOLIC DIMENSION: 3.34 CM
RIGHT VENTRICULAR LENGTH IN DIASTOLE (APICAL 4-CHAMBER VIEW): 7.57 CM
RV MID DIAMA: 2.05 CM
RV TB RVSP: 5 MMHG
RV TISSUE DOPPLER FREE WALL SYSTOLIC VELOCITY 1 (APICAL 4 CHAMBER VIEW): 13.33 CM/S
SINUS: 2.93 CM
STJ: 2.61 CM
TDI LATERAL: 0.06 M/S
TDI SEPTAL: 0.06 M/S
TDI: 0.06 M/S
TR MAX PG: 16 MMHG
TRICUSPID ANNULAR PLANE SYSTOLIC EXCURSION: 1.85 CM
TV REST PULMONARY ARTERY PRESSURE: 19 MMHG
Z-SCORE OF LEFT VENTRICULAR DIMENSION IN END DIASTOLE: -3.18
Z-SCORE OF LEFT VENTRICULAR DIMENSION IN END SYSTOLE: -2.54

## 2023-12-27 ENCOUNTER — HOSPITAL ENCOUNTER (OUTPATIENT)
Dept: RADIOLOGY | Facility: HOSPITAL | Age: 78
Discharge: HOME | End: 2023-12-27
Attending: INTERNAL MEDICINE
Payer: MEDICARE

## 2023-12-27 DIAGNOSIS — C34.31 MALIGNANT NEOPLASM OF LOWER LOBE OF RIGHT LUNG: Chronic | ICD-10-CM

## 2023-12-27 DIAGNOSIS — R06.02 SOB (SHORTNESS OF BREATH): ICD-10-CM

## 2023-12-27 PROCEDURE — 71250 CT THORAX DX C-: CPT | Mod: 26,,, | Performed by: RADIOLOGY

## 2023-12-27 PROCEDURE — 71250 CT CHEST WITHOUT CONTRAST: ICD-10-PCS | Mod: 26,,, | Performed by: RADIOLOGY

## 2023-12-27 PROCEDURE — 71250 CT THORAX DX C-: CPT | Mod: TC,PO

## 2024-01-12 ENCOUNTER — LAB VISIT (OUTPATIENT)
Dept: LAB | Facility: HOSPITAL | Age: 79
End: 2024-01-12
Attending: INTERNAL MEDICINE
Payer: MEDICARE

## 2024-01-12 ENCOUNTER — OFFICE VISIT (OUTPATIENT)
Dept: CARDIOLOGY | Facility: CLINIC | Age: 79
End: 2024-01-12
Payer: MEDICARE

## 2024-01-12 VITALS
SYSTOLIC BLOOD PRESSURE: 118 MMHG | WEIGHT: 159.19 LBS | BODY MASS INDEX: 26.52 KG/M2 | DIASTOLIC BLOOD PRESSURE: 81 MMHG | HEART RATE: 72 BPM | HEIGHT: 65 IN

## 2024-01-12 DIAGNOSIS — C34.31 MALIGNANT NEOPLASM OF LOWER LOBE OF RIGHT LUNG: Chronic | ICD-10-CM

## 2024-01-12 DIAGNOSIS — E03.9 ACQUIRED HYPOTHYROIDISM: ICD-10-CM

## 2024-01-12 DIAGNOSIS — I77.9 MILD CAROTID ARTERY DISEASE: Chronic | ICD-10-CM

## 2024-01-12 DIAGNOSIS — I10 ESSENTIAL (PRIMARY) HYPERTENSION: Chronic | ICD-10-CM

## 2024-01-12 DIAGNOSIS — E03.9 ACQUIRED HYPOTHYROIDISM: Chronic | ICD-10-CM

## 2024-01-12 DIAGNOSIS — I25.118 CORONARY ARTERY DISEASE OF NATIVE ARTERY OF NATIVE HEART WITH STABLE ANGINA PECTORIS: Primary | Chronic | ICD-10-CM

## 2024-01-12 LAB
T3 SERPL-MCNC: 91 NG/DL (ref 60–180)
T4 FREE SERPL-MCNC: 1.19 NG/DL (ref 0.71–1.51)

## 2024-01-12 PROCEDURE — 3079F DIAST BP 80-89 MM HG: CPT | Mod: CPTII,S$GLB,, | Performed by: INTERNAL MEDICINE

## 2024-01-12 PROCEDURE — 1101F PT FALLS ASSESS-DOCD LE1/YR: CPT | Mod: CPTII,S$GLB,, | Performed by: INTERNAL MEDICINE

## 2024-01-12 PROCEDURE — 84439 ASSAY OF FREE THYROXINE: CPT | Performed by: INTERNAL MEDICINE

## 2024-01-12 PROCEDURE — 36415 COLL VENOUS BLD VENIPUNCTURE: CPT | Mod: PO | Performed by: INTERNAL MEDICINE

## 2024-01-12 PROCEDURE — 3074F SYST BP LT 130 MM HG: CPT | Mod: CPTII,S$GLB,, | Performed by: INTERNAL MEDICINE

## 2024-01-12 PROCEDURE — 3288F FALL RISK ASSESSMENT DOCD: CPT | Mod: CPTII,S$GLB,, | Performed by: INTERNAL MEDICINE

## 2024-01-12 PROCEDURE — 84480 ASSAY TRIIODOTHYRONINE (T3): CPT | Performed by: INTERNAL MEDICINE

## 2024-01-12 PROCEDURE — 99214 OFFICE O/P EST MOD 30 MIN: CPT | Mod: S$GLB,,, | Performed by: INTERNAL MEDICINE

## 2024-01-12 PROCEDURE — 1159F MED LIST DOCD IN RCRD: CPT | Mod: CPTII,S$GLB,, | Performed by: INTERNAL MEDICINE

## 2024-01-12 PROCEDURE — 99999 PR PBB SHADOW E&M-EST. PATIENT-LVL IV: CPT | Mod: PBBFAC,,, | Performed by: INTERNAL MEDICINE

## 2024-01-12 PROCEDURE — 1126F AMNT PAIN NOTED NONE PRSNT: CPT | Mod: CPTII,S$GLB,, | Performed by: INTERNAL MEDICINE

## 2024-01-12 NOTE — PROGRESS NOTES
Subjective:    Patient ID:  Cat Meza is a 78 y.o. female who presents for Follow-up (Follow up and results to test/labs//)        HPI    DISCUSSED TESTS, ISAMAR ROSAS HAS A NORMAL LV FUNCTION SCLEROTIC AORTIC VALVE MITRAL ANNULAR CALCIFICATION NO STILL, CHEST CT AROUND LUNG STABLE, BNP NORMAL TSH SLIGHTLY LOW, GFR 58, CBC OK, FOLLOWED BY ONCOLOGY TO HAVE CT AND PET SCAN ,HALEY MCLAIN,  FEELS BETTER, SEE ROS  Past Medical History:   Diagnosis Date    Acute coronary syndrome     LIGHT HEART ATTACK YEARS AGO ??    Bowel obstruction 2018    Cancer     breast    Cancer     lung    COPD (chronic obstructive pulmonary disease)     Coronary artery disease     Diabetes mellitus     Edema     Heart murmur     Hyperlipidemia     Hypertension     Thyroid disease     hypothyroid    Valvular regurgitation      Past Surgical History:   Procedure Laterality Date    BOWEL RESECTION  05/2018    BREAST SURGERY  2011    breast reconstruction    BREAST SURGERY  05/07/2018    implant exchange    CARDIAC CATHETERIZATION      CHOLECYSTECTOMY  5/31/2018    Procedure: CHOLECYSTECTOMY;  Surgeon: Zev Durán MD;  Location: [facility] OR;  Service: General;;  opened at 1010      COLON SURGERY      gall stones      HYSTERECTOMY      INCISION OF INTESTINE N/A 5/31/2018    Procedure: ENTEROTOMY- REPAIR;  Surgeon: Zev Durán MD;  Location: [facility] OR;  Service: General;  Laterality: N/A;    LAPAROSCOPIC CHOLECYSTECTOMY N/A 5/31/2018    Procedure: CHOLECYSTECTOMY, LAPAROSCOPIC- ATTEMPTED;  Surgeon: Zev Durán MD;  Location: [facility] OR;  Service: General;  Laterality: N/A;  attempted    LIPOMA RESECTION Right 5/31/2018    Procedure: EXCISION, LIPOMA;  Surgeon: Zev Durán MD;  Location: [facility] OR;  Service: General;  Laterality: Right;    LYSIS OF ADHESIONS  6/2/2018    Procedure: LYSIS, ADHESIONS;  Surgeon: Zev Durán MD;  Location: [facility];  Service: General;;    MASTECTOMY Bilateral      with breast implants bilat     Family History    Problem Relation Age of Onset    Heart disease Mother     Cancer Father      Social History     Socioeconomic History    Marital status:    Tobacco Use    Smoking status: Former     Current packs/day: 0.00     Types: Cigarettes     Quit date: 2002     Years since quittin.4    Smokeless tobacco: Never   Substance and Sexual Activity    Alcohol use: No    Drug use: No    Sexual activity: Not Currently     Social Determinants of Health     Physical Activity: Inactive (9/3/2020)    Exercise Vital Sign     Days of Exercise per Week: 0 days     Minutes of Exercise per Session: 0 min   Stress: No Stress Concern Present (9/3/2020)    [employer] [place] [employer] - Occupational Stress Questionnaire     Feeling of Stress : Not at all   Social Connections: Moderately Integrated (9/3/2020)    Social Connection and Isolation Panel [NHANES]     Frequency of Communication with Friends and Family: More than three times a week     Frequency of Social Gatherings with Friends and Family: More than three times a week     Attends [employer] Services: 1 to 4 times per year     Active Member of Clubs or Organizations: Yes     Attends Club or Organization Meetings: 1 to 4 times per year     Marital Status:        Review of patient's allergies indicates:   Allergen Reactions    Adhesive     Niacin Hives    Niacin preparations     Penicillins     Phenergan [promethazine] Other (See Comments)     Could not wake up    Shellfish containing products     Ciprofloxacin Rash and Other (See Comments)     Pain at infusion site       Current Outpatient Medications:     A-C-E-zinc ox-selen AA-copper 1,000 unit-60 mg-30 unit Tab, Take 1 capsule by mouth., Disp: , Rfl:     alendronate (FOSAMAX) 70 MG tablet, , Disp: , Rfl:     ascorbic acid, vitamin C, (VITAMIN C) 1000 MG tablet, Take 500 mg by mouth once daily. , Disp: , Rfl:     aspirin 81 MG Chew, Take 81 mg by mouth once daily., Disp: , Rfl:     clindamycin  (CLEOCIN) 300 MG capsule, TAKE 1 CAPSULE BY MOUTH EVERY 8 HOURS FOR 10 DAYS, Disp: , Rfl:     coenzyme Q10 200 mg capsule, Take 200 mg by mouth once daily., Disp: , Rfl:     cyanocobalamin (VITAMIN B-12) 1000 MCG tablet, Take 100 mcg by mouth once daily., Disp: , Rfl:     cycloSPORINE (RESTASIS) 0.05 % ophthalmic emulsion, 1 drop 2 (two) times daily., Disp: , Rfl:     docusate sodium (COLACE) 100 MG capsule, Take 100 mg by mouth every evening., Disp: , Rfl:     dulaglutide (TRULICITY) 1.5 mg/0.5 mL pen injector, Inject into the skin every 7 days., Disp: , Rfl:     exemestane (AROMASIN) 25 mg tablet, , Disp: , Rfl:     ezetimibe (ZETIA) 10 mg tablet, TAKE 1 TABLET BY MOUTH EVERY EVENING., Disp: 90 tablet, Rfl: 2    fenofibrate (TRICOR) 145 MG tablet, Take 145 mg by mouth once daily., Disp: , Rfl:     fluconazole (DIFLUCAN) 150 MG Tab, , Disp: , Rfl:     hydrALAZINE (APRESOLINE) 100 MG tablet, TAKE 1 TABLET (100 MG TOTAL) BY MOUTH EVERY 12 (TWELVE) HOURS., Disp: 180 tablet, Rfl: 1    hydroCHLOROthiazide (HYDRODIURIL) 12.5 MG Tab, Take 1 tablet (12.5 mg total) by mouth every other day., Disp: 45 tablet, Rfl: 1    HYDROcodone-acetaminophen (NORCO) 7.5-325 mg per tablet, Take 1 tablet by mouth every 6 (six) hours as needed for Pain., Disp: 20 tablet, Rfl: 0    lactobacillus acidophilus & bulgar (LACTINEX) 100 million cell packet, Take 1 tablet by mouth every evening. , Disp: , Rfl:     LYSINE HCL ORAL, Take by mouth., Disp: , Rfl:     magnesium oxide 400 mg magnesium Tab, Take 400 mg by mouth once daily. , Disp: , Rfl:     metformin (GLUCOPHAGE) 500 MG tablet, Take 500 mg by mouth daily with breakfast., Disp: , Rfl:     methocarbamoL (ROBAXIN) 500 MG Tab, Take 1 tablet (500 mg total) by mouth nightly as needed (breast contracture  muscle spasms)., Disp: 20 tablet, Rfl: 0    metoprolol succinate (TOPROL-XL) 25 MG 24 hr tablet, Take 25 mg by mouth every evening. , Disp: , Rfl:     montelukast (SINGULAIR) 10 mg tablet, Take  10 mg by mouth every evening., Disp: , Rfl:     nitroGLYCERIN (NITROSTAT) 0.4 MG SL tablet, Place 1 tablet (0.4 mg total) under the tongue every 5 (five) minutes as needed., Disp: 25 tablet, Rfl: 0    olmesartan (BENICAR) 40 MG tablet, Take 1 tablet (40 mg total) by mouth once daily., Disp: 90 tablet, Rfl: 1    omega-3 acid ethyl esters (LOVAZA) 1 gram capsule, Take 1 capsule by mouth 2 (two) times daily., Disp: , Rfl: 5    pantoprazole (PROTONIX) 40 MG tablet, , Disp: , Rfl:     potassium chloride (KLOR-CON) 10 MEQ TbSR, Take 1 tablet (10 mEq total) by mouth every other day., Disp: 45 tablet, Rfl: 1    rosuvastatin (CRESTOR) 5 MG tablet, Take 1 tablet (5 mg total) by mouth once daily., Disp: 90 tablet, Rfl: 2    SYNTHROID 75 mcg tablet, 100 mcg., Disp: , Rfl:     TRUE METRIX GLUCOSE TEST STRIP Strp, USE TO CHECK BLOOD SUGAR DAILY, Disp: , Rfl:     vitamin D 1000 units Tab, Take 2,000 Units by mouth once daily., Disp: , Rfl:     vitamin E 100 UNIT capsule, Take 100 Units by mouth once daily., Disp: , Rfl:     XIGDUO XR 5-500 mg TBph, , Disp: , Rfl:     Review of Systems   Constitutional: Negative for chills, diaphoresis, fever, malaise/fatigue, night sweats, weight gain and weight loss.   HENT:  Negative for congestion and odynophagia.    Eyes:  Negative for blurred vision and visual disturbance.   Cardiovascular:  Negative for chest pain, claudication, cyanosis, dyspnea on exertion (MILD), irregular heartbeat, leg swelling, near-syncope, orthopnea, palpitations, paroxysmal nocturnal dyspnea and syncope.   Respiratory:  Negative for cough, hemoptysis, shortness of breath and wheezing.    Endocrine: Negative.    Hematologic/Lymphatic: Negative for adenopathy. Does not bruise/bleed easily.   Skin:  Negative for color change and rash.   Musculoskeletal:  Negative for back pain and falls.   Gastrointestinal:  Negative for abdominal pain, change in bowel habit, dysphagia, jaundice, melena and nausea.   Genitourinary:   "Negative for dysuria and flank pain.   Neurological:  Negative for focal weakness, light-headedness, loss of balance, tremors (OCC) and weakness.   Psychiatric/Behavioral:  Negative for altered mental status and depression.    Allergic/Immunologic: Negative for hives and persistent infections.        Objective:      Vitals:    01/12/24 1238   BP: 118/81   Pulse: 72   Weight: 72.2 kg (159 lb 2.8 oz)   Height: 5' 5" (1.651 m)   PainSc: 0-No pain     Body mass index is 26.49 kg/m².    Physical Exam  Constitutional:       General: She is not in acute distress.     Appearance: Normal appearance.   HENT:      Head: Normocephalic and atraumatic.   Eyes:      General: No scleral icterus.     Extraocular Movements: Extraocular movements intact.   Neck:      Vascular: Normal carotid pulses. No hepatojugular reflux or JVD.   Cardiovascular:      Rate and Rhythm: Regular rhythm. No extrasystoles are present.     Pulses:           Carotid pulses are 2+ on the right side and 2+ on the left side.       Radial pulses are 2+ on the right side and 2+ on the left side.        Posterior tibial pulses are 2+ on the right side and 2+ on the left side.      Heart sounds: Murmur heard.      Systolic murmur is present with a grade of 1/6 at the lower left sternal border and apex.      No friction rub. No S4 sounds.   Pulmonary:      Effort: Pulmonary effort is normal.      Breath sounds: Normal breath sounds. No rales.   Abdominal:      Palpations: Abdomen is soft.      Tenderness: There is no abdominal tenderness.   Musculoskeletal:      Cervical back: Neck supple.      Right lower leg: No edema.      Left lower leg: No edema.   Skin:     General: Skin is warm and dry.      Capillary Refill: Capillary refill takes less than 2 seconds.   Neurological:      General: No focal deficit present.      Mental Status: She is alert and oriented to person, place, and time.      Cranial Nerves: Cranial nerves 2-12 are intact.   Psychiatric:         " Mood and Affect: Mood normal.         Speech: Speech normal.         Behavior: Behavior normal.                 ..    Chemistry        Component Value Date/Time     12/12/2023 1630    K 3.9 12/12/2023 1630     12/12/2023 1630    CO2 23 12/12/2023 1630    BUN 10 12/12/2023 1630    CREATININE 1.0 12/12/2023 1630     (H) 12/12/2023 1630        Component Value Date/Time    CALCIUM 9.4 12/12/2023 1630    ALKPHOS 56 12/12/2023 1630    AST 20 12/12/2023 1630    ALT 17 12/12/2023 1630    BILITOT 0.5 12/12/2023 1630    ESTGFRAFRICA >60.0 10/20/2021 0820    EGFRNONAA >60.0 10/20/2021 0820            ..  Lab Results   Component Value Date    CHOL 111 (L) 11/18/2021    CHOL 121 05/04/2021    CHOL 127 07/02/2020     Lab Results   Component Value Date    HDL 45 11/18/2021    HDL 40 05/04/2021    HDL 48 07/02/2020     Lab Results   Component Value Date    LDLCALC 51.4 (L) 11/18/2021    LDLCALC 52.8 (L) 05/04/2021    LDLCALC 54.6 (L) 07/02/2020     Lab Results   Component Value Date    TRIG 73 11/18/2021    TRIG 141 05/04/2021    TRIG 122 07/02/2020     Lab Results   Component Value Date    CHOLHDL 40.5 11/18/2021    CHOLHDL 33.1 05/04/2021    CHOLHDL 37.8 07/02/2020     ..  Lab Results   Component Value Date    WBC 7.64 12/12/2023    HGB 13.7 12/12/2023    HCT 41.1 12/12/2023    MCV 91 12/12/2023     12/12/2023       Test(s) Reviewed  I have reviewed the following in detail:  [] Stress test   [] Angiography   [x] Echocardiogram   [x] Labs   [x] Other:       Assessment:         ICD-10-CM ICD-9-CM   1. Coronary artery disease of native artery of native heart with stable angina pectoris  I25.118 414.01     413.9   2. Mild carotid artery disease  I77.9 447.9   3. Acquired hypothyroidism  E03.9 244.9   4. Essential (primary) hypertension  I10 401.9   5. Malignant neoplasm of lower lobe of right lung  C34.31 162.5     Problem List Items Addressed This Visit          Cardiac/Vascular    Essential (primary)  hypertension    Coronary artery disease of native artery of native heart with stable angina pectoris - Primary    Mild carotid artery disease       Oncology    Malignant neoplasm of lower lobe of right lung       Endocrine    Acquired hypothyroidism    Relevant Orders    T3 (Completed)    T4, FREE (Completed)        Plan:     CHECK TFT'S, LOW TSH, COPY OF CT SCAN GIVEN TO PATIENT SHE IS FOLLOWED AT [facility] AND SHE IS TO HAVE PET SCAN PLAQUE THAT SHE IS STILL HAVE BEEN DUAL CANCER PATIENT UNDERSTAND AND SHE WILL FOLLOW-UP SHE DOES HAVE FOLLOW-UP APPOINTMENT SHE NEEDS TO FOLLOW-UP SOONER.ALL CV CLINICALLY STABLE, NO ANGINA, NO HF, NO TIA, NO CLINICAL ARRHYTHMIA,CONTINUE CURRENT MEDS, EDUCATION, DIET, EXERCISE AS MUCH AS POSSIBLE, INCREASED CV RISK WITH CANCER METASTATIC CANCER, F/U 6 MO      Coronary artery disease of native artery of native heart with stable angina pectoris    Mild carotid artery disease    Acquired hypothyroidism  Comments:  ASSESS  Orders:  -     T3; Future; Expected date: 01/12/2024  -     T4, FREE; Future; Expected date: 01/12/2024    Essential (primary) hypertension  Comments:  CONTROLLED    Malignant neoplasm of lower lobe of right lung    RTC Low level/low impact aerobic exercise 5x's/wk. Heart healthy diet and risk factor modification.    See labs and med orders.    Aerobic exercise 5x's/wk. Heart healthy diet and risk factor modification.    See labs and med orders.

## 2024-02-29 DIAGNOSIS — E78.00 HYPERCHOLESTEROLEMIA: Primary | ICD-10-CM

## 2024-03-01 RX ORDER — EZETIMIBE 10 MG/1
TABLET ORAL
Qty: 90 TABLET | Refills: 0 | Status: SHIPPED | OUTPATIENT
Start: 2024-03-01 | End: 2024-06-10

## 2024-04-08 DIAGNOSIS — I25.118 CORONARY ARTERY DISEASE OF NATIVE ARTERY OF NATIVE HEART WITH STABLE ANGINA PECTORIS: Primary | ICD-10-CM

## 2024-04-08 RX ORDER — OLMESARTAN MEDOXOMIL 40 MG/1
40 TABLET ORAL DAILY
Qty: 90 TABLET | Refills: 0 | Status: SHIPPED | OUTPATIENT
Start: 2024-04-08 | End: 2025-04-08

## 2024-05-15 ENCOUNTER — OFFICE VISIT (OUTPATIENT)
Dept: CARDIOLOGY | Facility: CLINIC | Age: 79
End: 2024-05-15
Payer: MEDICARE

## 2024-05-15 VITALS
HEART RATE: 101 BPM | WEIGHT: 153.69 LBS | BODY MASS INDEX: 25.61 KG/M2 | HEIGHT: 65 IN | DIASTOLIC BLOOD PRESSURE: 66 MMHG | SYSTOLIC BLOOD PRESSURE: 111 MMHG

## 2024-05-15 DIAGNOSIS — I34.0 NON-RHEUMATIC MITRAL REGURGITATION: Chronic | ICD-10-CM

## 2024-05-15 DIAGNOSIS — E78.00 HYPERCHOLESTEROLEMIA: Chronic | ICD-10-CM

## 2024-05-15 DIAGNOSIS — I10 ESSENTIAL (PRIMARY) HYPERTENSION: Chronic | ICD-10-CM

## 2024-05-15 DIAGNOSIS — Z79.01 LONG TERM (CURRENT) USE OF ANTICOAGULANTS: ICD-10-CM

## 2024-05-15 DIAGNOSIS — R06.02 SOB (SHORTNESS OF BREATH): Primary | ICD-10-CM

## 2024-05-15 DIAGNOSIS — I25.118 CORONARY ARTERY DISEASE OF NATIVE ARTERY OF NATIVE HEART WITH STABLE ANGINA PECTORIS: Chronic | ICD-10-CM

## 2024-05-15 DIAGNOSIS — R07.2 PRECORDIAL PAIN: ICD-10-CM

## 2024-05-15 PROCEDURE — 3078F DIAST BP <80 MM HG: CPT | Mod: CPTII,S$GLB,, | Performed by: INTERNAL MEDICINE

## 2024-05-15 PROCEDURE — 3074F SYST BP LT 130 MM HG: CPT | Mod: CPTII,S$GLB,, | Performed by: INTERNAL MEDICINE

## 2024-05-15 PROCEDURE — 1126F AMNT PAIN NOTED NONE PRSNT: CPT | Mod: CPTII,S$GLB,, | Performed by: INTERNAL MEDICINE

## 2024-05-15 PROCEDURE — 99214 OFFICE O/P EST MOD 30 MIN: CPT | Mod: S$GLB,,, | Performed by: INTERNAL MEDICINE

## 2024-05-15 PROCEDURE — 1159F MED LIST DOCD IN RCRD: CPT | Mod: CPTII,S$GLB,, | Performed by: INTERNAL MEDICINE

## 2024-05-15 RX ORDER — FLUTICASONE FUROATE, UMECLIDINIUM BROMIDE AND VILANTEROL TRIFENATATE 200; 62.5; 25 UG/1; UG/1; UG/1
POWDER RESPIRATORY (INHALATION)
COMMUNITY
Start: 2024-04-09

## 2024-05-15 RX ORDER — LEVOCETIRIZINE DIHYDROCHLORIDE 5 MG/1
5 TABLET, FILM COATED ORAL
COMMUNITY
Start: 2024-05-13 | End: 2024-05-23

## 2024-05-15 RX ORDER — APIXABAN 5 MG/1
TABLET, FILM COATED ORAL
COMMUNITY
Start: 2024-05-04

## 2024-05-15 RX ORDER — GABAPENTIN 100 MG/1
100 CAPSULE ORAL 3 TIMES DAILY
COMMUNITY
Start: 2024-03-28

## 2024-05-15 NOTE — PROGRESS NOTES
Subjective:    Patient ID:  Cat Meza is a 78 y.o. female who presents for Coronary artery disease of native artery of native heart wi        HPI    DISCUSSED LABS TFT'S OK, DOING OK, SOME RAMIREZ,EASY SOB FATIGUE, CHRONIC COUGH,  OVERALL OK, TO HAVE PET SCAN IN JULY, HAD VEIN PROCEDURE RECENTLY AVALA, SEE ROS  Past Medical History:   Diagnosis Date    Acute coronary syndrome     LIGHT HEART ATTACK YEARS AGO ??    Bowel obstruction 2018    Cancer     breast    Cancer     lung    COPD (chronic obstructive pulmonary disease)     Coronary artery disease     Diabetes mellitus     Edema     Heart murmur     Hyperlipidemia     Hypertension     Thyroid disease     hypothyroid    Valvular regurgitation      Past Surgical History:   Procedure Laterality Date    BOWEL RESECTION  05/2018    BREAST SURGERY  2011    breast reconstruction    BREAST SURGERY  05/07/2018    implant exchange    CARDIAC CATHETERIZATION      CHOLECYSTECTOMY  5/31/2018    Procedure: CHOLECYSTECTOMY;  Surgeon: Zev Durán MD;  Location: [facility] OR;  Service: General;;  opened at 1010      COLON SURGERY      gall stones      HYSTERECTOMY      INCISION OF INTESTINE N/A 5/31/2018    Procedure: ENTEROTOMY- REPAIR;  Surgeon: Zev Durán MD;  Location: [facility] OR;  Service: General;  Laterality: N/A;    LAPAROSCOPIC CHOLECYSTECTOMY N/A 5/31/2018    Procedure: CHOLECYSTECTOMY, LAPAROSCOPIC- ATTEMPTED;  Surgeon: Zev Durán MD;  Location: [facility] OR;  Service: General;  Laterality: N/A;  attempted    LIPOMA RESECTION Right 5/31/2018    Procedure: EXCISION, LIPOMA;  Surgeon: Zev Durán MD;  Location: [facility] OR;  Service: General;  Laterality: Right;    LYSIS OF ADHESIONS  6/2/2018    Procedure: LYSIS, ADHESIONS;  Surgeon: Zev Durán MD;  Location: [facility];  Service: General;;    MASTECTOMY Bilateral      with breast implants bilat     Family History   Problem Relation Name Age of Onset    Heart disease Mother      Cancer Father        Social History     Socioeconomic History    Marital status:    Tobacco Use    Smoking status: Former     Current packs/day: 0.00     Types: Cigarettes     Quit date: 2002     Years since quittin.7    Smokeless tobacco: Never   Substance and Sexual Activity    Alcohol use: No    Drug use: No    Sexual activity: Not Currently     Social Determinants of Health     Food Insecurity: Unknown (2023)    [place] [facility] [place] Vital Sign     Ran Out of Food in the Last Year: Never true   Transportation Needs: Unknown (2023)    [place] [facility] [place] - Transportation     Lack of Transportation (Medical): No   Physical Activity: Inactive (9/3/2020)    Exercise Vital Sign     Days of Exercise per Week: 0 days     Minutes of Exercise per Session: 0 min   Stress: No Stress Concern Present (9/3/2020)    [employer] [place] [employer] - Occupational Stress Questionnaire     Feeling of Stress : Not at all   Housing Stability: Unknown (2023)    [place] [facility] [place] Vital Sign     Unstable Housing in the Last Year: No       Review of patient's allergies indicates:   Allergen Reactions    Adhesive     Niacin Hives    Niacin preparations     Penicillins     Phenergan [promethazine] Other (See Comments)     Could not wake up    Shellfish containing products     Ciprofloxacin Rash and Other (See Comments)     Pain at infusion site       Current Outpatient Medications:     ascorbic acid, vitamin C, (VITAMIN C) 1000 MG tablet, Take 500 mg by mouth once daily. , Disp: , Rfl:     aspirin 81 MG Chew, Take 81 mg by mouth once daily., Disp: , Rfl:     cyanocobalamin (VITAMIN B-12) 1000 MCG tablet, Take 100 mcg by mouth once daily., Disp: , Rfl:     docusate sodium (COLACE) 100 MG capsule, Take 100 mg by mouth  every evening., Disp: , Rfl:     dulaglutide (TRULICITY) 1.5 mg/0.5 mL pen injector, Inject into the skin every 7 days., Disp: , Rfl:     ELIQUIS 5 mg Tab, Take by mouth., Disp: , Rfl:     exemestane (AROMASIN) 25 mg tablet, , Disp: , Rfl:     ezetimibe (ZETIA) 10 mg tablet, TAKE 1 TABLET BY MOUTH EVERY EVENING., Disp: 90 tablet, Rfl: 0    fenofibrate (TRICOR) 145 MG tablet, Take 145 mg by mouth once daily., Disp: , Rfl:     gabapentin (NEURONTIN) 100 MG capsule, Take 100 mg by mouth 3 (three) times daily., Disp: , Rfl:     hydrALAZINE (APRESOLINE) 100 MG tablet, TAKE 1 TABLET (100 MG TOTAL) BY MOUTH EVERY 12 (TWELVE) HOURS., Disp: 180 tablet, Rfl: 1    lactobacillus acidophilus & bulgar (LACTINEX) 100 million cell packet, Take 1 tablet by mouth every evening. , Disp: , Rfl:     levocetirizine (XYZAL) 5 MG tablet, Take 5 mg by mouth., Disp: , Rfl:     magnesium oxide 400 mg magnesium Tab, Take 400 mg by mouth once daily. , Disp: , Rfl:     methocarbamoL (ROBAXIN) 500 MG Tab, Take 1 tablet (500 mg total) by mouth nightly as needed (breast contracture  muscle spasms)., Disp: 20 tablet, Rfl: 0    metoprolol succinate (TOPROL-XL) 25 MG 24 hr tablet, Take 25 mg by mouth every evening. , Disp: , Rfl:     montelukast (SINGULAIR) 10 mg tablet, Take 10 mg by mouth every evening., Disp: , Rfl:     nitroGLYCERIN (NITROSTAT) 0.4 MG SL tablet, Place 1 tablet (0.4 mg total) under the tongue every 5 (five) minutes as needed., Disp: 25 tablet, Rfl: 0    olmesartan (BENICAR) 40 MG tablet, TAKE 1 TABLET (40 MG TOTAL) BY MOUTH ONCE DAILY., Disp: 90 tablet, Rfl: 0    pantoprazole (PROTONIX) 40 MG tablet, , Disp: , Rfl:     rosuvastatin (CRESTOR) 5 MG tablet, Take 1 tablet (5 mg total) by mouth once daily., Disp: 90 tablet, Rfl: 2    SYNTHROID 75 mcg tablet, 100 mcg., Disp: , Rfl:     TRELEGY ELLIPTA 200-62.5-25 mcg inhaler, , Disp: , Rfl:     TRUE METRIX GLUCOSE TEST STRIP Strp, USE TO CHECK BLOOD SUGAR DAILY, Disp:  , Rfl:     vitamin D 1000 units Tab, Take 2,000 Units by mouth once daily., Disp: , Rfl:     vitamin E 100 UNIT capsule, Take 100 Units by mouth once daily., Disp: , Rfl:     XIGDUO XR 5-500 mg TBph, , Disp: , Rfl:     A-C-E-zinc ox-selen AA-copper 1,000 unit-60 mg-30 unit Tab, Take 1 capsule by mouth. (Patient not taking: Reported on 5/15/2024), Disp: , Rfl:     alendronate (FOSAMAX) 70 MG tablet, , Disp: , Rfl:     clindamycin (CLEOCIN) 300 MG capsule, TAKE 1 CAPSULE BY MOUTH EVERY 8 HOURS FOR 10 DAYS (Patient not taking: Reported on 5/15/2024), Disp: , Rfl:     coenzyme Q10 200 mg capsule, Take 200 mg by mouth once daily. (Patient not taking: Reported on 5/15/2024), Disp: , Rfl:     cycloSPORINE (RESTASIS) 0.05 % ophthalmic emulsion, 1 drop 2 (two) times daily. (Patient not taking: Reported on 5/15/2024), Disp: , Rfl:     fluconazole (DIFLUCAN) 150 MG Tab, , Disp: , Rfl:     hydroCHLOROthiazide (HYDRODIURIL) 12.5 MG Tab, Take 1 tablet (12.5 mg total) by mouth every other day., Disp: 45 tablet, Rfl: 1    HYDROcodone-acetaminophen (NORCO) 7.5-325 mg per tablet, Take 1 tablet by mouth every 6 (six) hours as needed for Pain. (Patient not taking: Reported on 5/15/2024), Disp: 20 tablet, Rfl: 0    LYSINE HCL ORAL, Take by mouth. (Patient not taking: Reported on 5/15/2024), Disp: , Rfl:     metformin (GLUCOPHAGE) 500 MG tablet, Take 500 mg by mouth daily with breakfast. (Patient not taking: Reported on 5/15/2024), Disp: , Rfl:     omega-3 acid ethyl esters (LOVAZA) 1 gram capsule, Take 1 capsule by mouth 2 (two) times daily. (Patient not taking: Reported on 5/15/2024), Disp: , Rfl: 5    potassium chloride (KLOR-CON) 10 MEQ TbSR, Take 1 tablet (10 mEq total) by mouth every other day. (Patient not taking: Reported on 5/15/2024), Disp: 45 tablet, Rfl: 1    Review of Systems   Constitutional: Positive for malaise/fatigue. Negative for chills, diaphoresis, fever, night sweats and weight loss.   HENT:  Negative  "for congestion and odynophagia.    Eyes:  Negative for blurred vision and visual disturbance.   Cardiovascular:  Positive for dyspnea on exertion (MOD). Negative for chest pain, claudication, cyanosis, irregular heartbeat, leg swelling, near-syncope, orthopnea, palpitations, paroxysmal nocturnal dyspnea and syncope.   Respiratory:  Positive for shortness of breath. Negative for cough, hemoptysis and wheezing.    Endocrine: Negative.    Hematologic/Lymphatic: Negative for adenopathy. Does not bruise/bleed easily.   Skin:  Negative for color change and rash.   Musculoskeletal:  Positive for arthritis and back pain (CENTER). Negative for falls.   Gastrointestinal:  Negative for abdominal pain, change in bowel habit, dysphagia, jaundice, melena and nausea.   Genitourinary:  Negative for dysuria and flank pain.   Neurological:  Negative for focal weakness, light-headedness, loss of balance, tremors (OCC) and weakness.   Psychiatric/Behavioral:  Negative for altered mental status and depression.    Allergic/Immunologic: Negative for hives and persistent infections.        Objective:      Vitals:    05/15/24 0933   BP: 111/66   Pulse: 101   Weight: 69.7 kg (153 lb 10.6 oz)   Height: 5' 5" (1.651 m)   PainSc: 0-No pain     Body mass index is 25.57 kg/m².    Physical Exam  Constitutional:       General: She is not in acute distress.     Appearance: Normal appearance.   HENT:      Head: Normocephalic and atraumatic.   Eyes:      Extraocular Movements: Extraocular movements intact.      Conjunctiva/sclera: Conjunctivae normal.   Neck:      Vascular: Normal carotid pulses. Carotid bruit present. No hepatojugular reflux or JVD.   Cardiovascular:      Rate and Rhythm: Normal rate and regular rhythm. No extrasystoles are present.     Pulses:           Carotid pulses are 2+ on the right side and 2+ on the left side.       Radial pulses are 2+ on the right side and 2+ on the left side.        Posterior tibial pulses are 2+ on the " right side and 2+ on the left side.      Heart sounds: Murmur heard.      Systolic murmur is present with a grade of 1/6 at the lower left sternal border and apex.      No friction rub. No S4 sounds.   Pulmonary:      Effort: Pulmonary effort is normal.      Breath sounds: Normal breath sounds. No rales.   Abdominal:      Palpations: Abdomen is soft.      Tenderness: There is no abdominal tenderness.   Musculoskeletal:      Cervical back: Neck supple.      Right lower leg: No edema.      Left lower leg: No edema.   Skin:     General: Skin is warm and dry.      Capillary Refill: Capillary refill takes less than 2 seconds.   Neurological:      General: No focal deficit present.      Mental Status: She is alert and oriented to person, place, and time.      Cranial Nerves: Cranial nerves 2-12 are intact.   Psychiatric:         Mood and Affect: Mood normal.         Speech: Speech normal.         Behavior: Behavior normal.               ..    Chemistry        Component Value Date/Time     12/12/2023 1630    K 3.9 12/12/2023 1630     12/12/2023 1630    CO2 23 12/12/2023 1630    BUN 10 12/12/2023 1630    CREATININE 1.0 12/12/2023 1630     (H) 12/12/2023 1630        Component Value Date/Time    CALCIUM 9.4 12/12/2023 1630    ALKPHOS 56 12/12/2023 1630    AST 20 12/12/2023 1630    ALT 17 12/12/2023 1630    BILITOT 0.5 12/12/2023 1630    ESTGFRAFRICA >60.0 10/20/2021 0820    EGFRNONAA >60.0 10/20/2021 0820            ..  Lab Results   Component Value Date    CHOL 111 (L) 11/18/2021    CHOL 121 05/04/2021    CHOL 127 07/02/2020     Lab Results   Component Value Date    HDL 45 11/18/2021    HDL 40 05/04/2021    HDL 48 07/02/2020     Lab Results   Component Value Date    LDLCALC 51.4 (L) 11/18/2021    LDLCALC 52.8 (L) 05/04/2021    LDLCALC 54.6 (L) 07/02/2020     Lab Results   Component Value Date    TRIG 73 11/18/2021    TRIG 141 05/04/2021    TRIG 122 07/02/2020     Lab Results   Component Value Date     CHOLHDL 40.5 11/18/2021    CHOLHDL 33.1 05/04/2021    CHOLHDL 37.8 07/02/2020     ..  Lab Results   Component Value Date    WBC 7.64 12/12/2023    HGB 13.7 12/12/2023    HCT 41.1 12/12/2023    MCV 91 12/12/2023     12/12/2023       Test(s) Reviewed  I have reviewed the following in detail:  [] Stress test   [] Angiography   [] Echocardiogram   [x] Labs   [] Other:       Assessment:         ICD-10-CM ICD-9-CM   1. SOB (shortness of breath)  R06.02 786.05   2. Coronary artery disease of native artery of native heart with stable angina pectoris  I25.118 414.01     413.9   3. Non-rheumatic mitral regurgitation  I34.0 424.0   4. Long term (current) use of anticoagulants  Z79.01 V58.61   5. Essential (primary) hypertension  I10 401.9   6. Precordial pain  R07.2 786.51   7. Hypercholesterolemia  E78.00 272.0     Problem List Items Addressed This Visit          Pulmonary    SOB (shortness of breath) - Primary    Relevant Orders    Nuclear Stress - Cardiology Interpreted       Cardiac/Vascular    Essential (primary) hypertension    Coronary artery disease of native artery of native heart with stable angina pectoris    Relevant Orders    Nuclear Stress - Cardiology Interpreted    Comprehensive Metabolic Panel    Lipid Panel    CBC Auto Differential    Non-rheumatic mitral regurgitation    Relevant Orders    Nuclear Stress - Cardiology Interpreted    Hypercholesterolemia    Relevant Orders    Comprehensive Metabolic Panel    Lipid Panel    CBC Auto Differential    Precordial pain       Hematology    Long term (current) use of anticoagulants        Plan:         CHECK LABS AND NUCLEAR STRESS TEST ASSESS FOR ISCHEMIA HAVE NO SYMPTOMS, CLASS 2 ANGINAL-TYPE SYMPTOMS NO OVERT HEART FAILURE NO CLINICAL ARRHYTHMIA DIET WALKING EXERCISE SOME HAVE HER SYMPTOMS PROBABLY RELATED TO ALSO UNDERLYING MALIGNANCY, RETURN TO CLINIC IN , 6 MO  SOB (shortness of breath)  -     Nuclear Stress - Cardiology Interpreted; Future    Coronary  artery disease of native artery of native heart with stable angina pectoris  -     Nuclear Stress - Cardiology Interpreted; Future  -     Comprehensive Metabolic Panel; Future; Expected date: 11/15/2024  -     Lipid Panel; Future; Expected date: 11/15/2024  -     CBC Auto Differential; Future; Expected date: 11/15/2024    Non-rheumatic mitral regurgitation  -     Nuclear Stress - Cardiology Interpreted; Future    Long term (current) use of anticoagulants    Essential (primary) hypertension    Precordial pain    Hypercholesterolemia  -     Comprehensive Metabolic Panel; Future; Expected date: 11/15/2024  -     Lipid Panel; Future; Expected date: 11/15/2024  -     CBC Auto Differential; Future; Expected date: 11/15/2024    RTC Low level/low impact aerobic exercise 5x's/wk. Heart healthy diet and risk factor modification.    See labs and med orders.    Aerobic exercise 5x's/wk. Heart healthy diet and risk factor modification.    See labs and med orders.

## 2024-05-16 ENCOUNTER — LAB VISIT (OUTPATIENT)
Dept: PRIMARY CARE CLINIC | Facility: CLINIC | Age: 79
End: 2024-05-16
Payer: MEDICARE

## 2024-05-16 DIAGNOSIS — E78.00 HYPERCHOLESTEROLEMIA: Chronic | ICD-10-CM

## 2024-05-16 DIAGNOSIS — I25.118 CORONARY ARTERY DISEASE OF NATIVE ARTERY OF NATIVE HEART WITH STABLE ANGINA PECTORIS: Chronic | ICD-10-CM

## 2024-05-16 LAB
ALBUMIN SERPL BCP-MCNC: 3.4 G/DL (ref 3.5–5.2)
ALP SERPL-CCNC: 58 U/L (ref 55–135)
ALT SERPL W/O P-5'-P-CCNC: 11 U/L (ref 10–44)
ANION GAP SERPL CALC-SCNC: 11 MMOL/L (ref 8–16)
AST SERPL-CCNC: 19 U/L (ref 10–40)
BASOPHILS # BLD AUTO: 0.03 K/UL (ref 0–0.2)
BASOPHILS NFR BLD: 0.2 % (ref 0–1.9)
BILIRUB SERPL-MCNC: 0.6 MG/DL (ref 0.1–1)
BUN SERPL-MCNC: 12 MG/DL (ref 8–23)
CALCIUM SERPL-MCNC: 9.4 MG/DL (ref 8.7–10.5)
CHLORIDE SERPL-SCNC: 105 MMOL/L (ref 95–110)
CHOLEST SERPL-MCNC: 145 MG/DL (ref 120–199)
CHOLEST/HDLC SERPL: 4.4 {RATIO} (ref 2–5)
CO2 SERPL-SCNC: 23 MMOL/L (ref 23–29)
CREAT SERPL-MCNC: 0.8 MG/DL (ref 0.5–1.4)
DIFFERENTIAL METHOD BLD: ABNORMAL
EOSINOPHIL # BLD AUTO: 0.5 K/UL (ref 0–0.5)
EOSINOPHIL NFR BLD: 3.5 % (ref 0–8)
ERYTHROCYTE [DISTWIDTH] IN BLOOD BY AUTOMATED COUNT: 12.6 % (ref 11.5–14.5)
EST. GFR  (NO RACE VARIABLE): >60 ML/MIN/1.73 M^2
GLUCOSE SERPL-MCNC: 77 MG/DL (ref 70–110)
HCT VFR BLD AUTO: 41.1 % (ref 37–48.5)
HDLC SERPL-MCNC: 33 MG/DL (ref 40–75)
HDLC SERPL: 22.8 % (ref 20–50)
HGB BLD-MCNC: 12.9 G/DL (ref 12–16)
IMM GRANULOCYTES # BLD AUTO: 0.03 K/UL (ref 0–0.04)
IMM GRANULOCYTES NFR BLD AUTO: 0.2 % (ref 0–0.5)
LDLC SERPL CALC-MCNC: 98 MG/DL (ref 63–159)
LYMPHOCYTES # BLD AUTO: 2.8 K/UL (ref 1–4.8)
LYMPHOCYTES NFR BLD: 21.8 % (ref 18–48)
MCH RBC QN AUTO: 29.7 PG (ref 27–31)
MCHC RBC AUTO-ENTMCNC: 31.4 G/DL (ref 32–36)
MCV RBC AUTO: 95 FL (ref 82–98)
MONOCYTES # BLD AUTO: 1.2 K/UL (ref 0.3–1)
MONOCYTES NFR BLD: 9.6 % (ref 4–15)
NEUTROPHILS # BLD AUTO: 8.4 K/UL (ref 1.8–7.7)
NEUTROPHILS NFR BLD: 64.7 % (ref 38–73)
NONHDLC SERPL-MCNC: 112 MG/DL
NRBC BLD-RTO: 0 /100 WBC
PLATELET # BLD AUTO: 387 K/UL (ref 150–450)
PMV BLD AUTO: 10.6 FL (ref 9.2–12.9)
POTASSIUM SERPL-SCNC: 5.7 MMOL/L (ref 3.5–5.1)
PROT SERPL-MCNC: 6.5 G/DL (ref 6–8.4)
RBC # BLD AUTO: 4.34 M/UL (ref 4–5.4)
SODIUM SERPL-SCNC: 139 MMOL/L (ref 136–145)
TRIGL SERPL-MCNC: 70 MG/DL (ref 30–150)
WBC # BLD AUTO: 12.96 K/UL (ref 3.9–12.7)

## 2024-05-16 PROCEDURE — 80061 LIPID PANEL: CPT | Performed by: INTERNAL MEDICINE

## 2024-05-16 PROCEDURE — 80053 COMPREHEN METABOLIC PANEL: CPT | Performed by: INTERNAL MEDICINE

## 2024-05-16 PROCEDURE — 85025 COMPLETE CBC W/AUTO DIFF WBC: CPT | Performed by: INTERNAL MEDICINE

## 2024-05-16 PROCEDURE — 36415 COLL VENOUS BLD VENIPUNCTURE: CPT | Mod: S$GLB,,, | Performed by: INTERNAL MEDICINE

## 2024-05-17 ENCOUNTER — TELEPHONE (OUTPATIENT)
Dept: CARDIOLOGY | Facility: CLINIC | Age: 79
End: 2024-05-17
Payer: MEDICARE

## 2024-05-17 NOTE — TELEPHONE ENCOUNTER
----- Message from Allyson Verduzco MD sent at 5/17/2024  1:18 PM CDT -----  Decrease potassium intake repeat potassium level in 10 days

## 2024-05-20 DIAGNOSIS — E87.5 HIGH POTASSIUM: Primary | ICD-10-CM

## 2024-05-21 ENCOUNTER — TELEPHONE (OUTPATIENT)
Dept: CARDIOLOGY | Facility: CLINIC | Age: 79
End: 2024-05-21
Payer: MEDICARE

## 2024-05-21 NOTE — TELEPHONE ENCOUNTER
----- Message from Collins Diaz sent at 5/21/2024  7:55 AM CDT -----  Regarding: chest pains  Contact: Daughter  Type:  Needs Medical Advice    Who Called: Ladi mosqueda's Daughter    Symptoms (please be specific): Chest pains - admited       Would the patient rather a call back or a response via [facility]? Call back    Best Call Back Number: [phone]      Additional Information: Pt is currently admitted in [place] with chest pains and they are doing a stress test while she is there.  Pt has a current appt to have a stress test done but wanted to let the office know she will have it done there.   Please advise -- Thank you

## 2024-05-23 ENCOUNTER — OFFICE VISIT (OUTPATIENT)
Dept: CARDIOLOGY | Facility: CLINIC | Age: 79
End: 2024-05-23
Payer: MEDICARE

## 2024-05-23 VITALS
DIASTOLIC BLOOD PRESSURE: 74 MMHG | HEIGHT: 66 IN | SYSTOLIC BLOOD PRESSURE: 101 MMHG | HEART RATE: 108 BPM | BODY MASS INDEX: 24.27 KG/M2 | WEIGHT: 151 LBS

## 2024-05-23 DIAGNOSIS — R63.0 APPETITE LOSS: Chronic | ICD-10-CM

## 2024-05-23 DIAGNOSIS — I30.0 ACUTE IDIOPATHIC PERICARDITIS: Primary | ICD-10-CM

## 2024-05-23 DIAGNOSIS — I20.89 EXERTIONAL ANGINA: Chronic | ICD-10-CM

## 2024-05-23 PROBLEM — E66.3 OVERWEIGHT (BMI 25.0-29.9): Status: RESOLVED | Noted: 2023-10-11 | Resolved: 2024-05-23

## 2024-05-23 PROCEDURE — 3288F FALL RISK ASSESSMENT DOCD: CPT | Mod: CPTII,S$GLB,, | Performed by: INTERNAL MEDICINE

## 2024-05-23 PROCEDURE — 99214 OFFICE O/P EST MOD 30 MIN: CPT | Mod: S$GLB,,, | Performed by: INTERNAL MEDICINE

## 2024-05-23 PROCEDURE — 99999 PR PBB SHADOW E&M-EST. PATIENT-LVL IV: CPT | Mod: PBBFAC,,, | Performed by: INTERNAL MEDICINE

## 2024-05-23 PROCEDURE — 1159F MED LIST DOCD IN RCRD: CPT | Mod: CPTII,S$GLB,, | Performed by: INTERNAL MEDICINE

## 2024-05-23 PROCEDURE — 3078F DIAST BP <80 MM HG: CPT | Mod: CPTII,S$GLB,, | Performed by: INTERNAL MEDICINE

## 2024-05-23 PROCEDURE — 3074F SYST BP LT 130 MM HG: CPT | Mod: CPTII,S$GLB,, | Performed by: INTERNAL MEDICINE

## 2024-05-23 PROCEDURE — 1125F AMNT PAIN NOTED PAIN PRSNT: CPT | Mod: CPTII,S$GLB,, | Performed by: INTERNAL MEDICINE

## 2024-05-23 PROCEDURE — 1101F PT FALLS ASSESS-DOCD LE1/YR: CPT | Mod: CPTII,S$GLB,, | Performed by: INTERNAL MEDICINE

## 2024-05-23 RX ORDER — CYPROHEPTADINE HYDROCHLORIDE 4 MG/1
4 TABLET ORAL 3 TIMES DAILY PRN
Qty: 90 TABLET | Refills: 0 | Status: SHIPPED | OUTPATIENT
Start: 2024-05-23

## 2024-05-23 RX ORDER — COLCHICINE 0.6 MG/1
0.6 TABLET ORAL 2 TIMES DAILY
COMMUNITY

## 2024-05-23 RX ORDER — NITROGLYCERIN 0.4 MG/1
0.4 TABLET SUBLINGUAL EVERY 5 MIN PRN
Qty: 25 TABLET | Refills: 0 | Status: SHIPPED | OUTPATIENT
Start: 2024-05-23 | End: 2025-05-23

## 2024-05-23 NOTE — PROGRESS NOTES
Subjective:    Patient ID:  Cat Meza is a 78 y.o. female who presents for Shortness of Breath and Chest Pain        HPI   STATUS POST ADMISSION TO [facility],  WITH ATYPICAL CHEST PAIN WITH THOUGHT TO BE PERICARDITIS VERSUS PLEURITIS STARTED ON COLCHICINE BLOOD PRESSURE WAS LOW MEDS WERE CHANGED, DX WITH BRONCHITIS, ?? PNEUMONIA, STILL WITH  BACK PAIN, HAD RADIATION TO R MID BACK, NO BLOOD CLOT, STILL WITH SOB AND EXERTIONAL CP, NORMAL STRESS, RECORDS REVIEWED, SEE ROS    Past Medical History:   Diagnosis Date    Acute coronary syndrome     LIGHT HEART ATTACK YEARS AGO ??    Bowel obstruction 2018    Cancer     breast    Cancer     lung    COPD (chronic obstructive pulmonary disease)     Coronary artery disease     Diabetes mellitus     Edema     Heart murmur     Hyperlipidemia     Hypertension     Thyroid disease     hypothyroid    Valvular regurgitation      Past Surgical History:   Procedure Laterality Date    BOWEL RESECTION  05/2018    BREAST SURGERY  2011    breast reconstruction    BREAST SURGERY  05/07/2018    implant exchange    CARDIAC CATHETERIZATION      CHOLECYSTECTOMY  5/31/2018    Procedure: CHOLECYSTECTOMY;  Surgeon: Zev Durán MD;  Location: [facility] OR;  Service: General;;  opened at 1010      COLON SURGERY      gall stones      HYSTERECTOMY      INCISION OF INTESTINE N/A 5/31/2018    Procedure: ENTEROTOMY- REPAIR;  Surgeon: Zev Durán MD;  Location: [facility] OR;  Service: General;  Laterality: N/A;    LAPAROSCOPIC CHOLECYSTECTOMY N/A 5/31/2018    Procedure: CHOLECYSTECTOMY, LAPAROSCOPIC- ATTEMPTED;  Surgeon: Zev Durán MD;  Location: [facility] OR;  Service: General;  Laterality: N/A;  attempted    LIPOMA RESECTION Right 5/31/2018    Procedure: EXCISION, LIPOMA;  Surgeon: Zev Durán MD;  Location: [facility] OR;  Service: General;  Laterality: Right;    LYSIS OF ADHESIONS  6/2/2018    Procedure: LYSIS, ADHESIONS;  Surgeon: Zev Durán MD;  Location: [facility] OR;  Service: General;;     MASTECTOMY Bilateral      with breast implants bilat     Family History   Problem Relation Name Age of Onset    Heart disease Mother      Cancer Father       Social History     Socioeconomic History    Marital status:    Tobacco Use    Smoking status: Former     Current packs/day: 0.00     Types: Cigarettes     Quit date: 2002     Years since quittin.7    Smokeless tobacco: Never   Substance and Sexual Activity    Alcohol use: No    Drug use: No    Sexual activity: Not Currently     Social Determinants of Health     Financial Resource Strain: Low Risk  (2024)    Received from [facility]    Overall Financial Resource Strain (CARDIA)     Difficulty of Paying Living Expenses: Not hard at all   Food Insecurity: No Food Insecurity (2024)    Received from [facility]    Hunger Vital Sign     Worried About Running Out of Food in the Last Year: Never true     Ran Out of Food in the Last Year: Never true   Transportation Needs: No Transportation Needs (2024)    Received from [facility]    PRAPARE - Transportation     Lack of Transportation (Medical): No     Lack of Transportation (Non-Medical): No   Physical Activity: Inactive (2024)    Received from [facility]    Exercise Vital Sign     Days of Exercise per Week: 0 days     Minutes of Exercise per Session: 0 min   Stress: No Stress Concern Present (9/3/2020)    [employer] - Occupational Stress Questionnaire     Feeling of Stress : Not at all   Housing Stability: Unknown (2023)    Received from [facility]    Housing Stability Vital Sign     Unstable Housing in the Last Year: No       Review of patient's allergies indicates:   Allergen Reactions    Adhesive     Niacin Hives    Niacin preparations     Penicillins     Phenergan [promethazine] Other (See Comments)     Could not wake up    Shellfish containing products     Ciprofloxacin Rash and Other (See Comments)      Pain at infusion site       Current Outpatient Medications:     aspirin 81 MG Chew, Take 81 mg by mouth once daily., Disp: , Rfl:     cyanocobalamin (VITAMIN B-12) 1000 MCG tablet, Take 100 mcg by mouth once daily., Disp: , Rfl:     dulaglutide (TRULICITY) 1.5 mg/0.5 mL pen injector, Inject into the skin every 7 days., Disp: , Rfl:     ELIQUIS 5 mg Tab, Take by mouth., Disp: , Rfl:     exemestane (AROMASIN) 25 mg tablet, , Disp: , Rfl:     ezetimibe (ZETIA) 10 mg tablet, TAKE 1 TABLET BY MOUTH EVERY EVENING., Disp: 90 tablet, Rfl: 0    gabapentin (NEURONTIN) 100 MG capsule, Take 100 mg by mouth 3 (three) times daily., Disp: , Rfl:     methocarbamoL (ROBAXIN) 500 MG Tab, Take 1 tablet (500 mg total) by mouth nightly as needed (breast contracture  muscle spasms)., Disp: 20 tablet, Rfl: 0    metoprolol succinate (TOPROL-XL) 25 MG 24 hr tablet, Take 25 mg by mouth every evening. , Disp: , Rfl:     rosuvastatin (CRESTOR) 5 MG tablet, Take 1 tablet (5 mg total) by mouth once daily., Disp: 90 tablet, Rfl: 2    SYNTHROID 75 mcg tablet, 100 mcg., Disp: , Rfl:     TRUE METRIX GLUCOSE TEST STRIP Strp, USE TO CHECK BLOOD SUGAR DAILY, Disp: , Rfl:     vitamin D 1000 units Tab, Take 2,000 Units by mouth once daily., Disp: , Rfl:     vitamin E 100 UNIT capsule, Take 100 Units by mouth once daily., Disp: , Rfl:     ascorbic acid, vitamin C, (VITAMIN C) 1000 MG tablet, Take 500 mg by mouth once daily. , Disp: , Rfl:     colchicine (COLCRYS) 0.6 mg tablet, Take 0.6 mg by mouth 2 (two) times daily., Disp: , Rfl:     cyproheptadine (PERIACTIN) 4 mg tablet, Take 1 tablet (4 mg total) by mouth 3 (three) times daily as needed., Disp: 90 tablet, Rfl: 0    fenofibrate (TRICOR) 145 MG tablet, Take 145 mg by mouth once daily., Disp: , Rfl:     hydroCHLOROthiazide (HYDRODIURIL) 12.5 MG Tab, Take 1 tablet (12.5 mg total) by mouth every other day., Disp: 45 tablet, Rfl: 1    lactobacillus acidophilus & bulgar (LACTINEX) 100 million cell  packet, Take 1 tablet by mouth every evening. , Disp: , Rfl:     LYSINE HCL ORAL, Take by mouth. (Patient not taking: Reported on 5/15/2024), Disp: , Rfl:     magnesium oxide 400 mg magnesium Tab, Take 400 mg by mouth once daily.  (Patient not taking: Reported on 5/23/2024), Disp: , Rfl:     montelukast (SINGULAIR) 10 mg tablet, Take 10 mg by mouth every evening. (Patient not taking: Reported on 5/23/2024), Disp: , Rfl:     nitroGLYCERIN (NITROSTAT) 0.4 MG SL tablet, Place 1 tablet (0.4 mg total) under the tongue every 5 (five) minutes as needed for Chest pain., Disp: 25 tablet, Rfl: 0    olmesartan (BENICAR) 40 MG tablet, TAKE 1 TABLET (40 MG TOTAL) BY MOUTH ONCE DAILY., Disp: 90 tablet, Rfl: 0    omega-3 acid ethyl esters (LOVAZA) 1 gram capsule, Take 1 capsule by mouth 2 (two) times daily. (Patient not taking: Reported on 5/15/2024), Disp: , Rfl: 5    pantoprazole (PROTONIX) 40 MG tablet, , Disp: , Rfl:     potassium chloride (KLOR-CON) 10 MEQ TbSR, Take 1 tablet (10 mEq total) by mouth every other day. (Patient not taking: Reported on 5/15/2024), Disp: 45 tablet, Rfl: 1    TRELEGY ELLIPTA 200-62.5-25 mcg inhaler, , Disp: , Rfl:     Review of Systems   Constitutional: Positive for decreased appetite and malaise/fatigue. Negative for chills, diaphoresis, fever, night sweats and weight loss.   HENT:  Negative for congestion and odynophagia.    Eyes:  Negative for blurred vision and visual disturbance.   Cardiovascular:  Positive for chest pain and dyspnea on exertion (MOD). Negative for claudication, cyanosis, irregular heartbeat, leg swelling, near-syncope, orthopnea, palpitations, paroxysmal nocturnal dyspnea and syncope.   Respiratory:  Positive for shortness of breath. Negative for cough, hemoptysis and wheezing.    Endocrine: Negative.    Hematologic/Lymphatic: Negative for adenopathy. Does not bruise/bleed easily.   Skin:  Negative for color change and rash.   Musculoskeletal:  Positive for arthritis and back  "pain (CENTER). Negative for falls.   Gastrointestinal:  Negative for abdominal pain, change in bowel habit, dysphagia, jaundice, melena and nausea.   Genitourinary:  Negative for dysuria and flank pain.   Neurological:  Negative for focal weakness, light-headedness, loss of balance, tremors (OCC) and weakness.   Psychiatric/Behavioral:  Negative for altered mental status and depression.    Allergic/Immunologic: Negative for hives and persistent infections.        Objective:      Vitals:    05/23/24 1542   BP: 101/74   Pulse: 108   Weight: 68.5 kg (151 lb 0.2 oz)   Height: 5' 6" (1.676 m)   PainSc:   7   PainLoc: Back     Body mass index is 24.37 kg/m².    Physical Exam  Constitutional:       General: She is not in acute distress.     Appearance: Normal appearance.   HENT:      Head: Normocephalic and atraumatic.   Eyes:      General: No scleral icterus.     Extraocular Movements: Extraocular movements intact.   Neck:      Vascular: Normal carotid pulses. No hepatojugular reflux or JVD.   Cardiovascular:      Rate and Rhythm: Normal rate and regular rhythm. No extrasystoles are present.     Pulses:           Carotid pulses are 2+ on the right side and 2+ on the left side.       Radial pulses are 2+ on the right side and 2+ on the left side.        Posterior tibial pulses are 2+ on the right side and 2+ on the left side.      Heart sounds: Murmur heard.      Systolic murmur is present with a grade of 1/6 at the lower left sternal border and apex.      No friction rub. No S4 sounds.   Pulmonary:      Effort: Pulmonary effort is normal.      Breath sounds: Normal breath sounds. No rales.   Abdominal:      Palpations: Abdomen is soft.      Tenderness: There is no abdominal tenderness.   Musculoskeletal:      Cervical back: Neck supple.      Right lower leg: No edema.      Left lower leg: No edema.   Skin:     General: Skin is warm and dry.      Capillary Refill: Capillary refill takes less than 2 seconds.      " Coloration: Skin is pale.   Neurological:      General: No focal deficit present.      Mental Status: She is alert and oriented to person, place, and time.      Cranial Nerves: Cranial nerves 2-12 are intact.   Psychiatric:         Mood and Affect: Mood normal.         Speech: Speech normal.         Behavior: Behavior normal.               ..    Chemistry        Component Value Date/Time     05/16/2024 0841    K 5.7 (H) 05/16/2024 0841     05/16/2024 0841    CO2 23 05/16/2024 0841    BUN 12 05/16/2024 0841    CREATININE 0.8 05/16/2024 0841    GLU 77 05/16/2024 0841        Component Value Date/Time    CALCIUM 9.4 05/16/2024 0841    ALKPHOS 58 05/16/2024 0841    AST 19 05/16/2024 0841    ALT 11 05/16/2024 0841    BILITOT 0.6 05/16/2024 0841    ESTGFRAFRICA >60.0 10/20/2021 0820    EGFRNONAA >60.0 10/20/2021 0820            ..  Lab Results   Component Value Date    CHOL 145 05/16/2024    CHOL 111 (L) 11/18/2021    CHOL 121 05/04/2021     Lab Results   Component Value Date    HDL 33 (L) 05/16/2024    HDL 45 11/18/2021    HDL 40 05/04/2021     Lab Results   Component Value Date    LDLCALC 98.0 05/16/2024    LDLCALC 51.4 (L) 11/18/2021    LDLCALC 52.8 (L) 05/04/2021     Lab Results   Component Value Date    TRIG 70 05/16/2024    TRIG 73 11/18/2021    TRIG 141 05/04/2021     Lab Results   Component Value Date    CHOLHDL 22.8 05/16/2024    CHOLHDL 40.5 11/18/2021    CHOLHDL 33.1 05/04/2021     ..  Lab Results   Component Value Date    WBC 12.96 (H) 05/16/2024    HGB 12.9 05/16/2024    HCT 41.1 05/16/2024    MCV 95 05/16/2024     05/16/2024       Test(s) Reviewed  I have reviewed the following in detail:  [x] Stress test   [] Angiography   [] Echocardiogram   [x] Labs   [x] Other:       Assessment:         ICD-10-CM ICD-9-CM   1. Acute idiopathic pericarditis  I30.0 420.91   2. Exertional angina  I20.89 413.9   3. Appetite loss  R63.0 783.0     Problem List Items Addressed This Visit           Cardiac/Vascular    Acute idiopathic pericarditis - Primary    Relevant Orders    Echo    Exertional angina    Relevant Orders    Echo       Other    Appetite loss        Plan:     OK  FOR PERICATINE, AND CHECK ON FOLLOW-UP WITH ONCOLOGY AND PCP, PRN SL NTG, LABS OK, CHECK ECHO, KEEP APPT, CLASS 2 ANGINA NO OVERT HEART FAILURE NO CLINICAL ARRHYTHMIA STAY WELL HYDRATED THINK SYMPTOMS MOSTLY RELATED TO LUNG CANCER      Acute idiopathic pericarditis  Comments:  PRESUMPTIVE DIAGNOSIS  Orders:  -     Echo    Exertional angina  -     Echo    Appetite loss    Other orders  -     cyproheptadine (PERIACTIN) 4 mg tablet; Take 1 tablet (4 mg total) by mouth 3 (three) times daily as needed.  Dispense: 90 tablet; Refill: 0  -     nitroGLYCERIN (NITROSTAT) 0.4 MG SL tablet; Place 1 tablet (0.4 mg total) under the tongue every 5 (five) minutes as needed for Chest pain.  Dispense: 25 tablet; Refill: 0    RTC Low level/low impact aerobic exercise 5x's/wk. Heart healthy diet and risk factor modification.    See labs and med orders.    Aerobic exercise 5x's/wk. Heart healthy diet and risk factor modification.    See labs and med orders.

## 2024-06-10 DIAGNOSIS — E78.00 HYPERCHOLESTEROLEMIA: ICD-10-CM

## 2024-06-10 RX ORDER — EZETIMIBE 10 MG/1
TABLET ORAL
Qty: 90 TABLET | Refills: 0 | Status: SHIPPED | OUTPATIENT
Start: 2024-06-10

## 2024-06-21 DIAGNOSIS — I25.118 CORONARY ARTERY DISEASE OF NATIVE ARTERY OF NATIVE HEART WITH STABLE ANGINA PECTORIS: Primary | ICD-10-CM

## 2024-06-21 RX ORDER — COLCHICINE 0.6 MG/1
0.6 TABLET ORAL 2 TIMES DAILY
Qty: 60 TABLET | Refills: 0 | Status: SHIPPED | OUTPATIENT
Start: 2024-06-21

## 2024-07-04 DIAGNOSIS — I25.118 CORONARY ARTERY DISEASE OF NATIVE ARTERY OF NATIVE HEART WITH STABLE ANGINA PECTORIS: ICD-10-CM

## 2024-07-05 RX ORDER — OLMESARTAN MEDOXOMIL 40 MG/1
TABLET ORAL
Qty: 90 TABLET | Refills: 0 | OUTPATIENT
Start: 2024-07-05

## 2024-07-08 DIAGNOSIS — I25.118 CORONARY ARTERY DISEASE OF NATIVE ARTERY OF NATIVE HEART WITH STABLE ANGINA PECTORIS: ICD-10-CM

## 2024-07-08 RX ORDER — OLMESARTAN MEDOXOMIL 40 MG/1
TABLET ORAL
Qty: 90 TABLET | Refills: 0 | Status: SHIPPED | OUTPATIENT
Start: 2024-07-08

## 2024-07-17 PROBLEM — R07.89 RIGHT-SIDED CHEST WALL PAIN: Status: ACTIVE | Noted: 2024-07-17

## 2024-07-18 PROBLEM — E44.0 MODERATE MALNUTRITION: Status: ACTIVE | Noted: 2024-07-18

## 2024-07-18 PROBLEM — Z71.89 ACP (ADVANCE CARE PLANNING): Status: ACTIVE | Noted: 2024-07-18

## 2024-07-18 PROBLEM — I27.82 CHRONIC PULMONARY EMBOLISM: Status: ACTIVE | Noted: 2024-07-18

## 2024-07-18 PROBLEM — J44.9 COPD (CHRONIC OBSTRUCTIVE PULMONARY DISEASE): Status: ACTIVE | Noted: 2024-07-18

## 2024-07-19 PROBLEM — C34.91 SQUAMOUS CELL CARCINOMA LUNG, RIGHT: Status: ACTIVE | Noted: 2024-07-19

## 2024-07-25 ENCOUNTER — CLINICAL SUPPORT (OUTPATIENT)
Dept: CARDIOLOGY | Facility: CLINIC | Age: 79
End: 2024-07-25
Attending: INTERNAL MEDICINE
Payer: MEDICARE

## 2024-07-25 VITALS — HEIGHT: 64 IN | WEIGHT: 142 LBS | BODY MASS INDEX: 24.24 KG/M2

## 2024-08-01 ENCOUNTER — TELEPHONE (OUTPATIENT)
Dept: CARDIOLOGY | Facility: CLINIC | Age: 79
End: 2024-08-01
Payer: MEDICARE

## 2024-08-01 NOTE — TELEPHONE ENCOUNTER
----- Message from Allyson Verduzco MD sent at 7/31/2024  4:46 PM CDT -----  Imaging tests results are within normal parameters.  Keep your follow up appointment.

## 2024-08-09 ENCOUNTER — TELEPHONE (OUTPATIENT)
Dept: CARDIOLOGY | Facility: CLINIC | Age: 79
End: 2024-08-09
Payer: MEDICARE

## 2024-08-09 PROBLEM — J90 PLEURAL EFFUSION: Status: ACTIVE | Noted: 2024-08-09

## 2024-09-23 DIAGNOSIS — E78.00 HYPERCHOLESTEROLEMIA: ICD-10-CM

## 2024-09-23 RX ORDER — EZETIMIBE 10 MG/1
TABLET ORAL
Qty: 90 TABLET | Refills: 0 | Status: SHIPPED | OUTPATIENT
Start: 2024-09-23

## 2024-11-20 ENCOUNTER — TELEPHONE (OUTPATIENT)
Dept: CARDIOLOGY | Facility: CLINIC | Age: 79
End: 2024-11-20
Payer: MEDICARE

## 2024-11-20 NOTE — TELEPHONE ENCOUNTER
Pt needing CV risk and medication instructions for a Pigtail Drain placement with Dr Huitron. Pt taking ASA and Eliquis.    Fax: [phone] and [phone]

## 2024-11-26 PROBLEM — J90 LOCULATED PLEURAL EFFUSION: Status: ACTIVE | Noted: 2024-11-26

## 2024-11-26 PROBLEM — I30.0 ACUTE IDIOPATHIC PERICARDITIS: Status: RESOLVED | Noted: 2024-05-23 | Resolved: 2024-11-26

## 2024-11-26 PROBLEM — K80.20 SYMPTOMATIC CHOLELITHIASIS: Status: RESOLVED | Noted: 2018-05-31 | Resolved: 2024-11-26

## 2024-11-26 PROBLEM — R60.0 BILATERAL LEG EDEMA: Status: RESOLVED | Noted: 2021-03-17 | Resolved: 2024-11-26

## 2024-11-26 PROBLEM — I20.89 EXERTIONAL ANGINA: Status: RESOLVED | Noted: 2024-05-23 | Resolved: 2024-11-26

## 2024-11-26 PROBLEM — T50.905A DRUG SIDE EFFECTS, INITIAL ENCOUNTER: Status: RESOLVED | Noted: 2021-11-17 | Resolved: 2024-11-26

## 2024-11-26 PROBLEM — K56.7 ILEUS: Status: RESOLVED | Noted: 2018-06-21 | Resolved: 2024-11-26

## 2024-11-26 PROBLEM — I65.23 CAROTID ARTERY PLAQUE, BILATERAL: Status: RESOLVED | Noted: 2023-01-11 | Resolved: 2024-11-26

## 2024-11-26 PROBLEM — K66.0 INTRA-ABDOMINAL ADHESIONS: Status: RESOLVED | Noted: 2018-06-21 | Resolved: 2024-11-26

## 2024-11-26 PROBLEM — R11.10 VOMITING: Status: RESOLVED | Noted: 2018-06-11 | Resolved: 2024-11-26

## 2024-11-26 PROBLEM — J90 RECURRENT PLEURAL EFFUSION ON RIGHT: Status: RESOLVED | Noted: 2024-08-09 | Resolved: 2024-11-26

## 2024-11-26 PROBLEM — I77.9 MILD CAROTID ARTERY DISEASE: Status: RESOLVED | Noted: 2021-03-17 | Resolved: 2024-11-26

## 2024-11-26 PROBLEM — S40.021D: Status: RESOLVED | Noted: 2020-11-16 | Resolved: 2024-11-26

## 2024-12-30 DIAGNOSIS — E78.00 HYPERCHOLESTEROLEMIA: ICD-10-CM

## 2024-12-30 RX ORDER — EZETIMIBE 10 MG/1
TABLET ORAL
Qty: 90 TABLET | Refills: 0 | Status: SHIPPED | OUTPATIENT
Start: 2024-12-30

## 2025-04-08 ENCOUNTER — TELEPHONE (OUTPATIENT)
Dept: CARDIOLOGY | Facility: CLINIC | Age: 80
End: 2025-04-08
Payer: MEDICARE

## 2025-04-08 DIAGNOSIS — E78.00 HYPERCHOLESTEROLEMIA: ICD-10-CM

## 2025-04-08 RX ORDER — EZETIMIBE 10 MG/1
10 TABLET ORAL NIGHTLY
Qty: 90 TABLET | Refills: 0 | Status: SHIPPED | OUTPATIENT
Start: 2025-04-08

## 2025-04-08 NOTE — TELEPHONE ENCOUNTER
Spoke with pt and scheduled her a hospital follow up with Dr. Verduzco in [facility].

## 2025-04-08 NOTE — TELEPHONE ENCOUNTER
----- Message from Aurelia sent at 4/8/2025  3:26 PM CDT -----  Contact: self  Type:  Needs Medical AdviceWho Called: selfSymptoms (please be specific): [facility] or [facility]? callBest Call Back Number: [phone] Information: please advise and thank you.